# Patient Record
Sex: MALE | Race: WHITE | NOT HISPANIC OR LATINO | Employment: FULL TIME | URBAN - METROPOLITAN AREA
[De-identification: names, ages, dates, MRNs, and addresses within clinical notes are randomized per-mention and may not be internally consistent; named-entity substitution may affect disease eponyms.]

---

## 2017-04-10 ENCOUNTER — OFFICE VISIT - HEALTHEAST (OUTPATIENT)
Dept: INTERNAL MEDICINE | Facility: CLINIC | Age: 32
End: 2017-04-10

## 2017-04-10 DIAGNOSIS — Z00.00 ROUTINE GENERAL MEDICAL EXAMINATION AT A HEALTH CARE FACILITY: ICD-10-CM

## 2017-04-10 DIAGNOSIS — E55.9 VITAMIN D INSUFFICIENCY: ICD-10-CM

## 2017-04-10 DIAGNOSIS — R21 RASH: ICD-10-CM

## 2017-04-10 DIAGNOSIS — J30.2 SEASONAL ALLERGIC RHINITIS, UNSPECIFIED ALLERGIC RHINITIS TRIGGER: ICD-10-CM

## 2017-04-10 LAB
CHOLEST SERPL-MCNC: 151 MG/DL
FASTING STATUS PATIENT QL REPORTED: NORMAL
HDLC SERPL-MCNC: 46 MG/DL

## 2017-04-10 ASSESSMENT — MIFFLIN-ST. JEOR: SCORE: 1747.52

## 2017-04-12 ENCOUNTER — COMMUNICATION - HEALTHEAST (OUTPATIENT)
Dept: INTERNAL MEDICINE | Facility: CLINIC | Age: 32
End: 2017-04-12

## 2017-09-20 ENCOUNTER — OFFICE VISIT - HEALTHEAST (OUTPATIENT)
Dept: INTERNAL MEDICINE | Facility: CLINIC | Age: 32
End: 2017-09-20

## 2017-09-20 ENCOUNTER — HOSPITAL ENCOUNTER (OUTPATIENT)
Dept: CT IMAGING | Facility: CLINIC | Age: 32
Discharge: HOME | End: 2017-09-20
Attending: INTERNAL MEDICINE

## 2017-09-20 DIAGNOSIS — S09.92XA NASAL TRAUMA, INITIAL ENCOUNTER: ICD-10-CM

## 2017-09-20 ASSESSMENT — MIFFLIN-ST. JEOR: SCORE: 1742.98

## 2017-09-26 ENCOUNTER — RECORDS - HEALTHEAST (OUTPATIENT)
Dept: ADMINISTRATIVE | Facility: OTHER | Age: 32
End: 2017-09-26

## 2018-02-23 ENCOUNTER — COMMUNICATION - HEALTHEAST (OUTPATIENT)
Dept: INTERNAL MEDICINE | Facility: CLINIC | Age: 33
End: 2018-02-23

## 2018-02-23 ENCOUNTER — AMBULATORY - HEALTHEAST (OUTPATIENT)
Dept: INTERNAL MEDICINE | Facility: CLINIC | Age: 33
End: 2018-02-23

## 2018-05-25 ENCOUNTER — AMBULATORY - HEALTHEAST (OUTPATIENT)
Dept: INTERNAL MEDICINE | Facility: CLINIC | Age: 33
End: 2018-05-25

## 2018-05-29 ENCOUNTER — OFFICE VISIT - HEALTHEAST (OUTPATIENT)
Dept: INTERNAL MEDICINE | Facility: CLINIC | Age: 33
End: 2018-05-29

## 2018-05-29 DIAGNOSIS — Z00.00 ROUTINE GENERAL MEDICAL EXAMINATION AT A HEALTH CARE FACILITY: ICD-10-CM

## 2018-05-29 DIAGNOSIS — B35.6 TINEA CRURIS: ICD-10-CM

## 2018-05-29 DIAGNOSIS — E55.9 VITAMIN D INSUFFICIENCY: ICD-10-CM

## 2018-05-29 LAB
ALBUMIN SERPL-MCNC: 4.2 G/DL (ref 3.5–5)
ALBUMIN UR-MCNC: NEGATIVE MG/DL
ALP SERPL-CCNC: 45 U/L (ref 45–120)
ALT SERPL W P-5'-P-CCNC: 18 U/L (ref 0–45)
ANION GAP SERPL CALCULATED.3IONS-SCNC: 9 MMOL/L (ref 5–18)
APPEARANCE UR: CLEAR
AST SERPL W P-5'-P-CCNC: 19 U/L (ref 0–40)
BILIRUB SERPL-MCNC: 0.8 MG/DL (ref 0–1)
BILIRUB UR QL STRIP: NEGATIVE
BUN SERPL-MCNC: 17 MG/DL (ref 8–22)
CALCIUM SERPL-MCNC: 9.4 MG/DL (ref 8.5–10.5)
CHLORIDE BLD-SCNC: 106 MMOL/L (ref 98–107)
CHOLEST SERPL-MCNC: 165 MG/DL
CO2 SERPL-SCNC: 25 MMOL/L (ref 22–31)
COLOR UR AUTO: YELLOW
CREAT SERPL-MCNC: 1.05 MG/DL (ref 0.7–1.3)
ERYTHROCYTE [DISTWIDTH] IN BLOOD BY AUTOMATED COUNT: 11.8 % (ref 11–14.5)
FASTING STATUS PATIENT QL REPORTED: YES
GFR SERPL CREATININE-BSD FRML MDRD: >60 ML/MIN/1.73M2
GLUCOSE BLD-MCNC: 81 MG/DL (ref 70–125)
GLUCOSE UR STRIP-MCNC: NEGATIVE MG/DL
HCT VFR BLD AUTO: 42.8 % (ref 40–54)
HDLC SERPL-MCNC: 43 MG/DL
HGB BLD-MCNC: 14.1 G/DL (ref 14–18)
HGB UR QL STRIP: NEGATIVE
KETONES UR STRIP-MCNC: NEGATIVE MG/DL
LDLC SERPL CALC-MCNC: 108 MG/DL
LEUKOCYTE ESTERASE UR QL STRIP: NEGATIVE
MCH RBC QN AUTO: 29.8 PG (ref 27–34)
MCHC RBC AUTO-ENTMCNC: 33.1 G/DL (ref 32–36)
MCV RBC AUTO: 90 FL (ref 80–100)
NITRATE UR QL: NEGATIVE
PH UR STRIP: 6 [PH] (ref 5–8)
PLATELET # BLD AUTO: 226 THOU/UL (ref 140–440)
PMV BLD AUTO: 7.4 FL (ref 7–10)
POTASSIUM BLD-SCNC: 4.2 MMOL/L (ref 3.5–5)
PROT SERPL-MCNC: 6.7 G/DL (ref 6–8)
RBC # BLD AUTO: 4.74 MILL/UL (ref 4.4–6.2)
SODIUM SERPL-SCNC: 140 MMOL/L (ref 136–145)
SP GR UR STRIP: 1.02 (ref 1–1.03)
TRIGL SERPL-MCNC: 69 MG/DL
UROBILINOGEN UR STRIP-ACNC: NORMAL
WBC: 6.2 THOU/UL (ref 4–11)

## 2018-05-29 ASSESSMENT — MIFFLIN-ST. JEOR: SCORE: 1736.97

## 2018-05-30 LAB
25(OH)D3 SERPL-MCNC: 25.3 NG/ML (ref 30–80)
25(OH)D3 SERPL-MCNC: 25.3 NG/ML (ref 30–80)

## 2018-05-31 ENCOUNTER — COMMUNICATION - HEALTHEAST (OUTPATIENT)
Dept: INTERNAL MEDICINE | Facility: CLINIC | Age: 33
End: 2018-05-31

## 2019-07-23 ENCOUNTER — OFFICE VISIT - HEALTHEAST (OUTPATIENT)
Dept: INTERNAL MEDICINE | Facility: CLINIC | Age: 34
End: 2019-07-23

## 2019-07-23 DIAGNOSIS — J30.2 SEASONAL ALLERGIC RHINITIS, UNSPECIFIED TRIGGER: ICD-10-CM

## 2019-07-23 DIAGNOSIS — E55.9 VITAMIN D INSUFFICIENCY: ICD-10-CM

## 2019-07-23 DIAGNOSIS — Z00.00 ROUTINE GENERAL MEDICAL EXAMINATION AT A HEALTH CARE FACILITY: ICD-10-CM

## 2019-07-23 LAB
CHOLEST SERPL-MCNC: 177 MG/DL
FASTING STATUS PATIENT QL REPORTED: YES
FASTING STATUS PATIENT QL REPORTED: YES
GLUCOSE BLD-MCNC: 87 MG/DL (ref 70–125)
HDLC SERPL-MCNC: 52 MG/DL
LDLC SERPL CALC-MCNC: 110 MG/DL
TRIGL SERPL-MCNC: 73 MG/DL

## 2019-07-23 ASSESSMENT — MIFFLIN-ST. JEOR: SCORE: 1737.88

## 2019-07-24 LAB
25(OH)D3 SERPL-MCNC: 26.3 NG/ML (ref 30–80)
25(OH)D3 SERPL-MCNC: 26.3 NG/ML (ref 30–80)

## 2019-07-25 ENCOUNTER — COMMUNICATION - HEALTHEAST (OUTPATIENT)
Dept: INTERNAL MEDICINE | Facility: CLINIC | Age: 34
End: 2019-07-25

## 2019-08-19 ENCOUNTER — COMMUNICATION - HEALTHEAST (OUTPATIENT)
Dept: INTERNAL MEDICINE | Facility: CLINIC | Age: 34
End: 2019-08-19

## 2019-08-20 ENCOUNTER — AMBULATORY - HEALTHEAST (OUTPATIENT)
Dept: INTERNAL MEDICINE | Facility: CLINIC | Age: 34
End: 2019-08-20

## 2019-08-20 DIAGNOSIS — J30.9 ALLERGIC RHINITIS: ICD-10-CM

## 2019-08-30 ENCOUNTER — OFFICE VISIT - HEALTHEAST (OUTPATIENT)
Dept: ALLERGY | Facility: CLINIC | Age: 34
End: 2019-08-30

## 2019-08-30 DIAGNOSIS — J31.0 NONALLERGIC RHINITIS: ICD-10-CM

## 2019-08-30 DIAGNOSIS — J30.1 SEASONAL ALLERGIC RHINITIS DUE TO POLLEN: ICD-10-CM

## 2019-08-30 ASSESSMENT — MIFFLIN-ST. JEOR: SCORE: 1749.67

## 2020-03-02 ENCOUNTER — HEALTH MAINTENANCE LETTER (OUTPATIENT)
Age: 35
End: 2020-03-02

## 2020-07-28 ENCOUNTER — OFFICE VISIT - HEALTHEAST (OUTPATIENT)
Dept: INTERNAL MEDICINE | Facility: CLINIC | Age: 35
End: 2020-07-28

## 2020-07-28 DIAGNOSIS — Z00.00 ROUTINE GENERAL MEDICAL EXAMINATION AT A HEALTH CARE FACILITY: ICD-10-CM

## 2020-07-28 DIAGNOSIS — L98.9 SKIN LESION: ICD-10-CM

## 2020-07-28 DIAGNOSIS — E55.9 VITAMIN D INSUFFICIENCY: ICD-10-CM

## 2020-07-28 DIAGNOSIS — H61.22 IMPACTED CERUMEN OF LEFT EAR: ICD-10-CM

## 2020-07-28 DIAGNOSIS — Z87.898 HISTORY OF FEVER: ICD-10-CM

## 2020-07-28 LAB
ALBUMIN SERPL-MCNC: 4.5 G/DL (ref 3.5–5)
ALBUMIN UR-MCNC: NEGATIVE MG/DL
ALP SERPL-CCNC: 46 U/L (ref 45–120)
ALT SERPL W P-5'-P-CCNC: 13 U/L (ref 0–45)
ANION GAP SERPL CALCULATED.3IONS-SCNC: 10 MMOL/L (ref 5–18)
APPEARANCE UR: CLEAR
AST SERPL W P-5'-P-CCNC: 17 U/L (ref 0–40)
BACTERIA #/AREA URNS HPF: NORMAL HPF
BILIRUB SERPL-MCNC: 1 MG/DL (ref 0–1)
BILIRUB UR QL STRIP: NEGATIVE
BUN SERPL-MCNC: 17 MG/DL (ref 8–22)
CALCIUM SERPL-MCNC: 9.5 MG/DL (ref 8.5–10.5)
CHLORIDE BLD-SCNC: 106 MMOL/L (ref 98–107)
CHOLEST SERPL-MCNC: 166 MG/DL
CO2 SERPL-SCNC: 23 MMOL/L (ref 22–31)
COLOR UR AUTO: YELLOW
CREAT SERPL-MCNC: 1.1 MG/DL (ref 0.7–1.3)
ERYTHROCYTE [DISTWIDTH] IN BLOOD BY AUTOMATED COUNT: 11.6 % (ref 11–14.5)
FASTING STATUS PATIENT QL REPORTED: YES
GFR SERPL CREATININE-BSD FRML MDRD: >60 ML/MIN/1.73M2
GLUCOSE BLD-MCNC: 90 MG/DL (ref 70–125)
GLUCOSE UR STRIP-MCNC: NEGATIVE MG/DL
HCT VFR BLD AUTO: 42.7 % (ref 40–54)
HDLC SERPL-MCNC: 52 MG/DL
HGB BLD-MCNC: 14.5 G/DL (ref 14–18)
HGB UR QL STRIP: NEGATIVE
KETONES UR STRIP-MCNC: NEGATIVE MG/DL
LDLC SERPL CALC-MCNC: 104 MG/DL
LEUKOCYTE ESTERASE UR QL STRIP: NEGATIVE
MCH RBC QN AUTO: 30.3 PG (ref 27–34)
MCHC RBC AUTO-ENTMCNC: 33.9 G/DL (ref 32–36)
MCV RBC AUTO: 90 FL (ref 80–100)
NITRATE UR QL: NEGATIVE
PH UR STRIP: 6 [PH] (ref 5–8)
PLATELET # BLD AUTO: 211 THOU/UL (ref 140–440)
PMV BLD AUTO: 7.8 FL (ref 7–10)
POTASSIUM BLD-SCNC: 4.1 MMOL/L (ref 3.5–5)
PROT SERPL-MCNC: 7 G/DL (ref 6–8)
RBC # BLD AUTO: 4.77 MILL/UL (ref 4.4–6.2)
RBC #/AREA URNS AUTO: NORMAL HPF
SODIUM SERPL-SCNC: 139 MMOL/L (ref 136–145)
SP GR UR STRIP: >=1.03 (ref 1–1.03)
SQUAMOUS #/AREA URNS AUTO: NORMAL LPF
TRIGL SERPL-MCNC: 51 MG/DL
UROBILINOGEN UR STRIP-ACNC: NORMAL
WBC #/AREA URNS AUTO: NORMAL HPF
WBC: 4.6 THOU/UL (ref 4–11)

## 2020-07-28 ASSESSMENT — MIFFLIN-ST. JEOR: SCORE: 1715.2

## 2020-07-29 LAB
25(OH)D3 SERPL-MCNC: 32.3 NG/ML (ref 30–80)
25(OH)D3 SERPL-MCNC: 32.3 NG/ML (ref 30–80)
COVID-19 ANTIBODY IGG: NEGATIVE

## 2020-07-30 ENCOUNTER — COMMUNICATION - HEALTHEAST (OUTPATIENT)
Dept: INTERNAL MEDICINE | Facility: CLINIC | Age: 35
End: 2020-07-30

## 2020-10-29 ENCOUNTER — COMMUNICATION - HEALTHEAST (OUTPATIENT)
Dept: INTERNAL MEDICINE | Facility: CLINIC | Age: 35
End: 2020-10-29

## 2020-11-11 ENCOUNTER — OFFICE VISIT - HEALTHEAST (OUTPATIENT)
Dept: INTERNAL MEDICINE | Facility: CLINIC | Age: 35
End: 2020-11-11

## 2020-11-11 DIAGNOSIS — K30 UPSET STOMACH: ICD-10-CM

## 2020-11-11 DIAGNOSIS — E73.9 LACTOSE INTOLERANCE: ICD-10-CM

## 2020-11-11 DIAGNOSIS — U07.1 CLINICAL DIAGNOSIS OF COVID-19: ICD-10-CM

## 2020-12-20 ENCOUNTER — HEALTH MAINTENANCE LETTER (OUTPATIENT)
Age: 35
End: 2020-12-20

## 2021-04-18 ENCOUNTER — HEALTH MAINTENANCE LETTER (OUTPATIENT)
Age: 36
End: 2021-04-18

## 2021-05-30 VITALS — WEIGHT: 162 LBS | BODY MASS INDEX: 20.14 KG/M2 | HEIGHT: 75 IN

## 2021-05-30 NOTE — PROGRESS NOTES
Office Visit - Physical   Abram Lemus   34 y.o.  male    Date of visit: 7/23/2019  Physician: Neptali Jacobsen MD     Assessment and Plan   1. Routine general medical examination at a health care facility  Patient was a healthy lifestyle.  He will continue same.  Labs as below.  - Lipid Cascade  - Glucose  - Vitamin D, Total (25-Hydroxy)    2. Seasonal allergic rhinitis, unspecified trigger  We will try to add Zyrtec-D during the day.  See if that improves things.  Also we can try some Singulair.  If neither of these things help, would recommend allergy evaluation.  - montelukast (SINGULAIR) 10 mg tablet; Take 1 tablet (10 mg total) by mouth daily.  Dispense: 30 tablet; Refill: 2  - cetirizine-pseudoephedrine (ZYRTEC-D) 5-120 mg per tablet; Take 1 tablet by mouth 2 (two) times a day as needed for allergies.  Dispense: 60 tablet; Refill: 2    3. Vitamin D insufficiency    - Vitamin D, Total (25-Hydroxy)        Return in about 1 year (around 7/23/2020) for Annual physical.     Chief Complaint   Chief Complaint   Patient presents with     Annual Exam     fasting today         Patient Profile   Social History     Social History Narrative     Not on file        Past Medical History   Patient Active Problem List   Diagnosis     Allergic rhinitis     Vitamin D insufficiency       Past Surgical History  He has no past surgical history on file.     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  Reports his been doing relatively well except his allergies have been very bad.  He has been using Zyrtec sometimes twice a day without much improvement.  He has not tolerated nasal steroids in the past.  He has not tried anything else.  He has not seen an allergist.  It is definitely seasonal in nature with spring and fall being the worst.  Is otherwise been feeling well.  His Lam[place] is going well.  He is in [facility].  They have  "a school.  He continues to play basketball.  Nothing new with his family.    Review of Systems: A comprehensive review of systems was negative except as noted.     Medications and Allergies   Current Outpatient Medications   Medication Sig Dispense Refill     cetirizine (ZYRTEC) 10 MG tablet Take 10 mg by mouth daily.       MULTIVITAMIN ORAL Take 1 tablet by mouth daily.       cetirizine-pseudoephedrine (ZYRTEC-D) 5-120 mg per tablet Take 1 tablet by mouth 2 (two) times a day as needed for allergies. 60 tablet 2     montelukast (SINGULAIR) 10 mg tablet Take 1 tablet (10 mg total) by mouth daily. 30 tablet 2     No current facility-administered medications for this visit.      Allergies   Allergen Reactions     Sulfa (Sulfonamide Antibiotics)         Family and Social History   No family history on file.     Social History     Tobacco Use     Smoking status: Never Smoker     Smokeless tobacco: Never Used   Substance Use Topics     Alcohol use: Not on file     Drug use: Not on file        Physical Exam   General Appearance:   Pleasant thin gentleman in no distress.    /62 (Patient Site: Right Arm, Patient Position: Sitting, Cuff Size: Adult Regular)   Pulse 70   Ht 6' 2.75\" (1.899 m)   Wt 159 lb (72.1 kg)   SpO2 98%   BMI 20.01 kg/m      EYES: Eyelids, conjunctiva, and sclera were normal. Pupils were normal. Cornea, iris, and lens were normal bilaterally.  HEAD, EARS, NOSE, MOUTH, AND THROAT: Head and face were normal. Hearing was normal to voice and the ears were normal to external exam. Nose appearance was normal and there was no discharge. Oropharynx was normal.  NECK: Neck appearance was normal. There were no neck masses and the thyroid was not enlarged.  RESPIRATORY: Breathing pattern was normal and the chest moved symmetrically.  Percussion/auscultatory percussion was normal.  Lung sounds were normal and there were no abnormal sounds.  CARDIOVASCULAR: Heart rate and rhythm were normal.  S1 and S2 were " normal and there were no extra sounds or murmurs. Peripheral pulses in arms and legs were normal.  Jugular venous pressure was normal.  There was no peripheral edema.  GASTROINTESTINAL: The abdomen was normal in contour.  Bowel sounds were present.  Percussion detected no organ enlargement or tenderness.  Palpation detected no tenderness, mass, or enlarged organs.   MUSCULOSKELETAL: Skeletal configuration was normal and muscle mass was normal for age. Joint appearance was overall normal.  LYMPHATIC: There were no enlarged nodes.  SKIN/HAIR/NAILS: Skin color was normal.  There were no skin lesions.  Hair and nails were normal.  NEUROLOGIC: The patient was alert and oriented to person, place, time, and circumstance. Speech was normal. Cranial nerves were normal. Motor strength was normal for age. The patient was normally coordinated.  PSYCHIATRIC:  Mood and affect were normal and the patient had normal recent and remote memory. The patient's judgment and insight were normal.    ADDITIONAL VITAL SIGNS: none  CHEST WALL/BREASTS: nml    RECTAL: def  GENITAL/URINARY: nml penis, no rashes.     Additional Information        Neptali Jacobsen MD  Internal Medicine  Contact me at [phone]

## 2021-05-31 VITALS — HEIGHT: 75 IN | BODY MASS INDEX: 20.02 KG/M2 | WEIGHT: 161 LBS

## 2021-05-31 NOTE — TELEPHONE ENCOUNTER
Call pt per his request.  Please send patient the calcium and vit D pt info and ask him to try to get vitamin D from his foods if he cannot tolerate a supplement.  I am okay with him taking Zyrtec and Mucinex D separately.  Finally, refer him to allergy per his request.  Diagnosis is allergic rhinitis.  Please place order for referral.

## 2021-05-31 NOTE — PATIENT INSTRUCTIONS - HE
During spring, antihistamine    Apr-June    [facility] Sinus Rinse    Sensimist??    Otherwise, may want to see ENT

## 2021-05-31 NOTE — PROGRESS NOTES
Chief complaint: Possible allergies    History of present illness: This is a pleasant 34-year-old gentleman who I was asked to see by Dr. Jacobsen for evaluation of allergies.  He states that he has noted that his allergy symptoms have been worse.  Symptoms consist of sneezing, congestion and a runny nose.  He states that he will become sick.  When he becomes sick he has a sore throat.  He denies any wheezing or shortness of breath.  No previous history of asthma.  He has used Zyrtec and he feels that that helps.  He was prescribed Zyrtec-D but tries to use it minimally.  He prefers to use Mucinex DM instead.  He was given fluticasone nasal spray to use last year but did not tolerate this.  He was recently prescribed montelukast but notes that did not seem to help.  He is never been allergy tested.  Sense of smell is slightly decreased but he does have a sense of smell.  He does not think he had sinus infections.  He does not note much pressure in his face when he becomes sick.  He is been getting sick a lot more frequently and wonders if this is secondary to allergies.  He has used nasal rinses with some success.    Past medical history: Otherwise unremarkable    Social history: He lives in an older home that has had some water damage in the basement with possible mold.  This is been remedied but it continues to be damp.  They do not have central air.  No animals at his home but they do have carpeting.  He is a .  Non-smoker.    Family history: No history of allergy to his knowledge    Review of Systems performed as above and the remainder is negative.       Current Outpatient Medications:      cetirizine (ZYRTEC) 10 MG tablet, Take 10 mg by mouth daily., Disp: , Rfl:      cetirizine-pseudoephedrine (ZYRTEC-D) 5-120 mg per tablet, Take 1 tablet by mouth 2 (two) times a day as needed for allergies., Disp: 60 tablet, Rfl: 2     montelukast (SINGULAIR) 10 mg tablet, Take 1 tablet (10 mg total) by mouth daily.,  "Disp: 30 tablet, Rfl: 2     MULTIVITAMIN ORAL, Take 1 tablet by mouth daily., Disp: , Rfl:      MULTIVITAMIN ORAL, Take by mouth., Disp: , Rfl:     Allergies   Allergen Reactions     Sulfa (Sulfonamide Antibiotics)        /66   Pulse 73   Ht 6' 2.75\" (1.899 m)   Wt 161 lb 9.6 oz (73.3 kg)   BMI 20.33 kg/m    Gen: Pleasant male not in acute distress  HEENT: Eyes no erythema of the bulbar or palpebral conjunctiva, no edema. Ears: TMs well visualized, no effusions. Nose: No congestion, mucosa normal. Mouth: Throat clear, no lip or tongue edema.   Cardiac: Regular rate and rhythm, no murmurs, rubs or gallops  Respiratory: Clear to auscultation bilaterally, no adventitious breath sounds  Lymph: No supraclavicular or cervical lymphadenopathy  Skin: No rashes or lesions  Psych: Alert and oriented times 3    Last Percutaneous Allergy Test Results  Trees  Cecilio, White  1:20 H  (W/F in mm): 0/0 (08/30/19 1618)  Birch Mix 1:20 H (W/F in mm): 0/0 (08/30/19 1618)  [place], Common 1:20 H (W/F in mm): 0/0 (08/30/19 1618)  Elm, American 1:20 H (W/F in mm): 0/0 (08/30/19 1618)  Menard, Shagbark 1:20 H (W/F in mm): 0/0 (08/30/19 1618)  Maple, Hard/Sugar 1:20 H (W/F in mm): 5/10 (08/30/19 1618)  [place] Mix 1:20 H (W/F in mm): 0/0 (08/30/19 1618)  [place], Red 1:20 H (W/F in mm): 0/0 (08/30/19 1618)  [place], American 1:20 H (W/F in mm): 0/0 (08/30/19 1618)  [place] Tree 1:20 H (W/F in mm): 0/0 (08/30/19 1618)  Dust Mites  D. Pteronyssinus Mite 30,000 AU/ML H (W/F in mm): 0/0 (08/30/19 1618)  D. Farinae Mite 30,000 AU/ML H (W/F in mm: 0/0 (08/30/19 1618)  Grasses  Grass Mix #4 10,000 BAU/ML H: 0/0 (08/30/19 1618)  Charly Grass 1:20 H (W/F in mm): 0/0 (08/30/19 1618)  Cockroach  Cockroach Mix 1:10 H (W/F in mm): 0/0 (08/30/19 1618)  Molds/Fungi  Alternaria Tenuis 1:10 H (W/F in mm): 0/0 (08/30/19 1618)  Aspergillus Fumigatus 1:10 H (W/F in mm): 0/0 (08/30/19 1618)  Homodendrum Cladosporioides 1:10 H (W/F in mm): 0/0 (08/30/19 " 1618)  Penicillin Notatum 1:10 H (W/F in mm): 0/0 (08/30/19 1618)  Epicoccum 1:10 H (W/F in mm): 0/0 (08/30/19 1618)  Weeds  Ragweed, Short 1:20 H (W/F in mm): 0/0 (08/30/19 1618)  [place]ock, [place] 1:20 H (W/F in mm): 0/0 (08/30/19 1618)  Lamb's Quarter 1:20 H (W/F in mm): 0/0 (08/30/19 1618)  Pigweed, Rough Red Root 1:20 H  (W/F in mm): 0/0 (08/30/19 1618)  Plantain, English 1:20 H  (W/F in mm): 0/0 (08/30/19 1618)  Sagebrush, Mugwort 1:20 H  (W/F in mm): 0/0 (08/30/19 1618)  Animal  Cat 10,000 BAU/ML H (W/F in mm): 0/0 (08/30/19 1618)  Dog 1:10 H (W/F in mm): 0/0 (08/30/19 1618)  Controls  Device Type: QUINTIP (08/30/19 1618)  Neg. control: 50% Glycerine/Saline H (W/F in mm): 0/0 (08/30/19 1618)  Pos. control: Histamine 6mg/ML (W/F in mms): 6/20 (08/30/19 1618)        Impression report and plan:    1.  Allergic rhinitis to Maple trees  2.  Non-allergic rhinitis    Allergy testing does not explain his symptoms.  He was positive only to maple which only blooms in the spring.  This would explain why montelukast did not help.  Zyrtec may have a drying effect and that could be helping with his postnasal drip.  Recommended a trial of nasal rinses daily.  Would also suggest perhaps Sensimist given that he did not tolerate traditional fluticasone nasal spray.  Otherwise, he could see ENT to discuss other options.  Stated that acid reflux could also be a possibility.  He does not have any symptoms consistent with acid reflux, however.  Follow as needed.

## 2021-06-01 VITALS — HEIGHT: 75 IN | WEIGHT: 158.8 LBS | BODY MASS INDEX: 19.74 KG/M2

## 2021-06-03 VITALS — BODY MASS INDEX: 19.77 KG/M2 | WEIGHT: 159 LBS | HEIGHT: 75 IN

## 2021-06-03 VITALS — BODY MASS INDEX: 20.09 KG/M2 | HEIGHT: 75 IN | WEIGHT: 161.6 LBS

## 2021-06-04 VITALS
DIASTOLIC BLOOD PRESSURE: 60 MMHG | WEIGHT: 154 LBS | HEART RATE: 70 BPM | SYSTOLIC BLOOD PRESSURE: 110 MMHG | OXYGEN SATURATION: 99 % | BODY MASS INDEX: 19.15 KG/M2 | HEIGHT: 75 IN

## 2021-06-10 NOTE — PROGRESS NOTES
Office Visit - Physical   Abram Lemus   31 y.o.  male    Date of visit: 4/10/2017  Physician: Neptali Jacobsen MD     Assessment and Plan   1. Routine general medical examination at a health care facility  Patient was a healthy lifestyle.  Labs as below.  Urged more fruits and vegetables in his diet.  - Comprehensive Metabolic Panel  - HM2(CBC w/o Differential)  - Urinalysis-UC if Indicated  - Cholesterol, Total  - HDL Cholesterol    2. Seasonal allergic rhinitis, unspecified allergic rhinitis trigger  Good control with current regimen.  Continue same.    3. Rash  Lotrisone prescribed.  He is twice daily for 2 weeks.  Call if not improved or resolved.    4. Vitamin D insufficiency  He had difficulty with vitamin D.  It made him feel woozy or dizzy.  Try to increase vitamin D in his diet.  - Vitamin D, Total (25-Hydroxy)        Return in about 1 year (around 4/10/2018) for Annual physical.     Chief Complaint   Chief Complaint   Patient presents with     Annual Exam     not fasting     Rash     rt thigh rash x 1 mo. Sxs: Red and itchy. No blood or discharge        Patient Profile   Social History     Social History Narrative        Past Medical History   Patient Active Problem List   Diagnosis     Allergic rhinitis     Head or neck swelling, mass, or lump     Vitamin D insufficiency       Past Surgical History  He has no past surgical history on file.     History of Present Illness   This 31 y.o. old patient comes in today for full physical.  He reports his been doing well.  I have not seen him in a couple years.  He can continues to remain fairly active.  Plays basketball at least once a week.  Maintains a very thin weight.  He eats fairly healthy although not enough fruits and vegetables he admits.  He has been dealing with persistent recurrent sore throat with allergies.  He is now on Flonase as well as Zyrtec with good control of that.  He notes a rash on his thigh he wants me to look at.  Tried some  "triamcinolone which turned the area brown but seemed to get worse again.  It is red and itchy.  Otherwise denies concerns.  He is probably going to be moving to a new [place] this summer.  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    Review of Systems: A comprehensive review of systems was negative except as noted.     Medications and Allergies   Current Outpatient Prescriptions   Medication Sig Dispense Refill     cetirizine (ZYRTEC) 10 MG tablet Take 10 mg by mouth daily.       fluticasone (FLONASE) 50 mcg/actuation nasal spray 1 spray into each nostril daily.       MULTIVITAMIN ORAL Take 1 tablet by mouth daily.       clotrimazole-betamethasone (LOTRISONE) cream Apply to affected area 2 times daily 15 g 0     No current facility-administered medications for this visit.      Allergies   Allergen Reactions     Sulfa (Sulfonamide Antibiotics)         Family and Social History   No family history on file.     Social History   Substance Use Topics     Smoking status: Never Smoker     Smokeless tobacco: None     Alcohol use None        Physical Exam   General Appearance:   Pleasant thin gentleman in no distress.    /74 (Patient Site: Right Arm, Patient Position: Sitting, Cuff Size: Adult Small)  Pulse 64  Ht 6' 2.5\" (1.892 m)  Wt 162 lb (73.5 kg)  SpO2 98%  BMI 20.52 kg/m2    EYES: Eyelids, conjunctiva, and sclera were normal. Pupils were normal. Cornea, iris, and lens were normal bilaterally.  HEAD, EARS, NOSE, MOUTH, AND THROAT: Head and face were normal. Hearing was normal to voice and the ears were normal to external exam. Nose appearance was normal and there was no discharge. Oropharynx was normal.  NECK: Neck appearance was normal. There were no neck masses and the thyroid was not enlarged.  RESPIRATORY: Breathing pattern was normal and the chest moved symmetrically.  Percussion/auscultatory percussion was normal.  Lung sounds were normal and there were no abnormal sounds.  CARDIOVASCULAR: " Heart rate and rhythm were normal.  S1 and S2 were normal and there were no extra sounds or murmurs. Peripheral pulses in arms and legs were normal.  Jugular venous pressure was normal.  There was no peripheral edema.  GASTROINTESTINAL: The abdomen was normal in contour.  Bowel sounds were present.  Percussion detected no organ enlargement or tenderness.  Palpation detected no tenderness, mass, or enlarged organs.   MUSCULOSKELETAL: Skeletal configuration was normal and muscle mass was normal for age. Joint appearance was overall normal.  He has a tendon cyst over the back of his right hand.  LYMPHATIC: There were no enlarged nodes.  SKIN/HAIR/NAILS: Skin color was normal.  He has a tinea lesion in his right medial thigh.  Hair and nails were normal.  NEUROLOGIC: The patient was alert and oriented to person, place, time, and circumstance. Speech was normal. Cranial nerves were normal. Motor strength was normal for age. The patient was normally coordinated.  PSYCHIATRIC:  Mood and affect were normal and the patient had normal recent and remote memory. The patient's judgment and insight were normal.    ADDITIONAL VITAL SIGNS: None  CHEST WALL/BREASTS: Normal   RECTAL: Deferred   GENITAL/URINARY: Normal      Additional Information        Neptali Jacobsen MD  Internal Medicine  Contact me at None

## 2021-06-10 NOTE — PROGRESS NOTES
Office Visit - Physical   Abram Lemus   35 y.o.  male    Date of visit: 7/28/2020  Physician: Neptali Jacobsen MD     Assessment and Plan   1. Routine general medical examination at a health care facility  He lives a healthy lifestyle.  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  Labs as below.  - COVID-19 Virus (Coronavirus) Antibody Screen, IgG; Future  - Lipid Cascade  - Comprehensive Metabolic Panel  - Urinalysis-UC if Indicated  - HM2(CBC w/o Differential)  - Vitamin D, Total (25-Hydroxy)  - Ambulatory referral to Dermatology    2. Vitamin D insufficiency  He is not taking vitamin D.  Check level today.  - Vitamin D, Total (25-Hydroxy)    3. Skin lesion  Likely not concerning but we will have him have undergo a baseline skin exam.  - Ambulatory referral to Dermatology    4. Impacted cerumen of left ear  Irrigation today.    5. History of fever  Check serologies per his request.  - COVID-19 Virus (Coronavirus) Antibody Screen, IgG; Future        Return in about 1 year (around 7/28/2021) for Annual physical.     Chief Complaint   Chief Complaint   Patient presents with     Annual Exam     fasting     Hearing Problem     check left ear - check for wax      Mass     pt reports a small mass on left side of head         Patient Profile   Social History     Social History Narrative     Not on file        Past Medical History   Patient Active Problem List   Diagnosis     Vasomotor rhinitis     Vitamin D insufficiency       Past Surgical History  He has no past surgical history on file.     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  Reports has been feeling well.  He took on another perished near his current 1.  He has an associate helping out.  He has been trying to stay healthy.  Does do some exercise.  Tries to eat healthy.  Notes a lesion on his scalp that he would like me to look at.  Notes decreased hearing and possibly something in his  "left ear.  He was ill while traveling back from [place] in February and wonders if he could have had COVID and would like to have antibody testing.  Wonders if I will prescribe him hydroxychloroquine if he gets sick with COVID-19.    Review of Systems: A comprehensive review of systems was negative except as noted.     Medications and Allergies   Current Outpatient Medications   Medication Sig Dispense Refill     MULTIVITAMIN ORAL Take 1 tablet by mouth daily.       No current facility-administered medications for this visit.      Allergies   Allergen Reactions     Sulfa (Sulfonamide Antibiotics)         Family and Social History   No family history on file.     Social History     Tobacco Use     Smoking status: Never Smoker     Smokeless tobacco: Never Used   Substance Use Topics     Alcohol use: Not on file     Drug use: Not on file        Physical Exam   General Appearance:   Pleasant very thin gentleman in no distress.    /60   Pulse 70   Ht 6' 2.75\" (1.899 m)   Wt 154 lb (69.9 kg)   SpO2 99%   BMI 19.38 kg/m      EYES: Eyelids, conjunctiva, and sclera were normal. Pupils were normal. Cornea, iris, and lens were normal bilaterally.  HEAD, EARS, NOSE, MOUTH, AND THROAT: Head and face were normal. Hearing was normal to voice and the ears were normal to external exam.  Small amount of cerumen in the left canal.  Nose appearance was normal and there was no discharge. Oropharynx was normal.  NECK: Neck appearance was normal. There were no neck masses and the thyroid was not enlarged.  RESPIRATORY: Breathing pattern was normal and the chest moved symmetrically.  Percussion/auscultatory percussion was normal.  Lung sounds were normal and there were no abnormal sounds.  CARDIOVASCULAR: Heart rate and rhythm were normal.  S1 and S2 were normal and there were no extra sounds or murmurs. Peripheral pulses in arms and legs were normal.  Jugular venous pressure was normal.  There was no peripheral " edema.  GASTROINTESTINAL: The abdomen was normal in contour.  Bowel sounds were present.  Percussion detected no organ enlargement or tenderness.  Palpation detected no tenderness, mass, or enlarged organs.   MUSCULOSKELETAL: Skeletal configuration was normal and muscle mass was normal for age. Joint appearance was overall normal.  LYMPHATIC: There were no enlarged nodes.  SKIN/HAIR/NAILS: Skin color was normal.  Hyperkeratotic left parietal region.  Hair and nails were normal.  NEUROLOGIC: The patient was alert and oriented to person, place, time, and circumstance. Speech was normal. Cranial nerves were normal. Motor strength was normal for age. The patient was normally coordinated.  PSYCHIATRIC:  Mood and affect were normal and the patient had normal recent and remote memory. The patient's judgment and insight were normal.    ADDITIONAL VITAL SIGNS: None  CHEST WALL/BREASTS: nml  RECTAL: Deferred  GENITAL/URINARY: Normal penis and testes.     Additional Information        Neptali Jacobsen MD  Internal Medicine  Contact me at [phone]

## 2021-06-13 NOTE — PROGRESS NOTES
"Abram Lemus is a 35 y.o. male who is being evaluated via a billable video visit.      The patient has been notified of following:     \"This video visit will be conducted via a call between you and your physician/provider. We have found that certain health care needs can be provided without the need for an in-person physical exam.  This service lets us provide the care you need with a video conversation.  If a prescription is necessary we can send it directly to your pharmacy.  If lab work is needed we can place an order for that and you can then stop by our lab to have the test done at a later time.    Video visits are billed at different rates depending on your insurance coverage. Please reach out to your insurance provider with any questions.    If during the course of the call the physician/provider feels a video visit is not appropriate, you will not be charged for this service.\"    Patient has given verbal consent to a Video visit? Yes  How would you like to obtain your AVS? AVS Preference: MyChart.  If dropped by the video visit, the video invitation should be sent to: Send to e-mail at: [email or web address]  Will anyone else be joining your video visit? No        Video Start Time: 1024    Additional provider notes:     Office Visit - Follow Up   Abram Lemus   35 y.o. male    Date of Visit: 11/11/2020    Chief Complaint   Patient presents with     Follow-up     [facility] Video: [phone] - discuss lactose intolerance (patient has been avoiding diary & taking lactaid supplement pills)      Follow Up     pt reports that he was tested positive for Covid (results came in yesterday) developed sxs's x 5 days         Assessment and Plan   1. Clinical diagnosis of COVID-19  He seems to be recovering already.  If he has worsening symptoms again or any new changes he is to let us know immediately.  I did offer for patient to go into the get well loop but he declines this.    2. Upset " stomach  Likely a development of lactose intolerance due to lactase deficiency.  I discussed this with him.  It could be that this would as a prodrome of his infection but he does not believe so.  I did discuss with patient that the treatment for lactose intolerance is avoidance of lactose or lactase replacement or both.  He understands.    3. Lactose intolerance  As above.        Return in about 9 months (around 8/11/2021) for Annual physical.     History of Present Illness   This 35 y.o. old patient joins me on a video visit.  He made this appointment because of some lactose intolerance he suspects.  He started having an upset stomach after drinking milk.  This persistent so he stopped drinking milk and his upset stomach some seem to go away.  There was no excessive flatulence or diarrhea.  No weight loss.  Otherwise feeling well.  This all started about a month ago.  He started adding back Lactaid or drinking Lactaid milk and he would tolerate those without difficulty so he presumes he is lactose intolerance.  Then however, patient over the weekend developed fever chills and body aches.  He is now been tested positive for COVID-19.  He is feeling better.  No longer having fever.  See below for symptoms.  He is quarantining at home.  He does not know how he acquired the coronavirus.    Review of Systems: A comprehensive review of systems was negative except as noted.     Medications, Allergies and Problem List   Reviewed, reconciled and updated  Post Discharge Medication Reconciliation Status:      Physical Exam   General Appearance:   Pleasant gentleman who appears well on video.    There were no vitals taken for this visit.         Additional Information   Current Outpatient Medications   Medication Sig Dispense Refill     ibuprofen (ADVIL,MOTRIN) 200 MG tablet Take 400 mg by mouth every 6 (six) hours as needed for pain.       lactase (LACTAID ORAL) Take 1 tablet by mouth daily. For abd pains/discomfort        MULTIVITAMIN ORAL Take 1 tablet by mouth daily.       zinc gluconate 50 mg tablet Take 50 mg by mouth daily.       No current facility-administered medications for this visit.      Allergies   Allergen Reactions     Sulfa (Sulfonamide Antibiotics)      Social History     Tobacco Use     Smoking status: Never Smoker     Smokeless tobacco: Never Used   Substance Use Topics     Alcohol use: Not on file     Drug use: Not on file       Review and/or order of clinical lab tests:  Review and/or order of radiology tests:  Review and/or order of medicine tests:  Discussion of test results with performing physician:  Decision to obtain old records and/or obtain history from someone other than the patient:  Review and summarization of old records and/or obtaining history from someone other than the patient and.or discussion of case with another health care provider:  Independent visualization of image, tracing or specimen itself:    Time: not applicable     Neptali Jacobsen MD    Concern for COVID-19  About how many days ago did these symptoms start? 11/7/20  Is this your first visit for this illness? Yes  In the 14 days before your symptoms started, have you had close contact with someone with COVID-19 (Coronavirus)? I do not know.  Do you have a fever or chills? Yes, I felt feverish or had chills  Are you having new or worsening difficulty breathing? No  Do you have new or worsening cough? No  Have you had any new or unexplained body aches? No  Have you experienced any of the following NEW symptoms?    Headache: Mild - ibuprofen prn     Sore throat: No    Loss of taste or smell: mild - changes in taste    Chest pain: No    Diarrhea: No    Rash: No  What treatments have you tried? Ibuprofen prn, zinc   Who do you live with? No one   Are you, or a household member, a healthcare worker or a ? No  Do you live in a nursing home, group home, or shelter? No  Do you have a way to get food/medications if quarantined?  Yes, I have a friend or family member who can help me.          Video-Visit Details    Type of service:  Video Visit    Video End Time (time video stopped): 1038  Originating Location (pt. Location): Home    Distant Location (provider location):  [facility]     Platform used for Video Visit: Nadia Jacobsen MD

## 2021-06-13 NOTE — PROGRESS NOTES
"  Office Visit - Follow Up   Abram Lemus   32 y.o. male    Date of Visit: 9/20/2017    Chief Complaint   Patient presents with     Facial Injury     hit a the back of a head with his nose. x4 days        Assessment and Plan   1. Nasal trauma, initial encounter  Given the persistent bleeding and severity of this going to do a CT scan of the facial bones to look at what all was done here.  We will do that today hold and call and then proceed with ENT evaluation presumably.  He is in agreement with that plan.  - CT Facial Bones Without Contrast; Future  - Ambulatory referral to ENT    Addendum: Patient does have subtle nasal bone fracture.  He will proceed with ENT consultation for further evaluation.    No Follow-up on file.     History of Present Illness   This 32 y.o. old delightful young Maria Esther  now in [facility] comes in today for evaluation of nasal trauma that he sustained 4 days ago.  He was playing basketball and got a head thrown back into his nose.  He had profuse bleeding for at least a couple hours.  Swelling and discomfort.  He has difficulty breathing through his nostrils.  He continues to have bleeding.  He notes dripping from his nose especially on the right every couple hours for the last 4 days.  He is concerned about that aspect.    Review of Systems: A comprehensive review of systems was negative except as noted.     Medications, Allergies and Problem List   Reviewed and updated     Physical Exam   General Appearance:   Pleasant young thin gentleman.  He has ecchymosis below his left eye.  He has some swelling of the nasal bridge.  I do not see any active bleeding today.  There is some septal deviation but I am not sure if that is new.    /56 (Patient Site: Right Arm, Patient Position: Sitting, Cuff Size: Adult Regular)  Pulse 86  Ht 6' 2.5\" (1.892 m)  Wt 161 lb (73 kg)  BMI 20.39 kg/m2         Additional Information   Current Outpatient Prescriptions   Medication " Sig Dispense Refill     cetirizine (ZYRTEC) 10 MG tablet Take 10 mg by mouth daily.       MULTIVITAMIN ORAL Take 1 tablet by mouth daily.       fluticasone (FLONASE) 50 mcg/actuation nasal spray 1 spray into each nostril daily.       No current facility-administered medications for this visit.      Allergies   Allergen Reactions     Sulfa (Sulfonamide Antibiotics)      Social History   Substance Use Topics     Smoking status: Never Smoker     Smokeless tobacco: None     Alcohol use None       Review and/or order of clinical lab tests:  Review and/or order of radiology tests:  Review and/or order of medicine tests:  Discussion of test results with performing physician:  Decision to obtain old records and/or obtain history from someone other than the patient:  Review and summarization of old records and/or obtaining history from someone other than the patient and.or discussion of case with another health care provider:  Independent visualization of image, tracing or specimen itself:    Time: total time spent with the patient was 15 minutes of which >50% was spent in counseling and coordination of care     Neptali Jacobsen MD

## 2021-06-18 NOTE — PROGRESS NOTES
Office Visit - Physical   Abram Lemus   33 y.o.  male    Date of visit: 5/29/2018  Physician: Neptali Jacobsen MD     Assessment and Plan   1. Routine general medical examination at a health care facility  Patient was a healthy lifestyle.  He will be due for colonoscopy in 2020.  He will be due for tetanus booster next year.  Continue his healthy active lifestyle and I will see him next year per  - Vitamin D, Total (25-Hydroxy)  - Lipid Cascade  - Comprehensive Metabolic Panel  - Urinalysis-UC if Indicated  - HM2(CBC w/o Differential); Future    2. Tinea cruris  We discussed ways of keeping his groin dryer with some powders and not using tight fitting underwear.  Also Lamisil cream prescribed.    3. Vitamin D insufficiency    - Vitamin D, Total (25-Hydroxy)        No Follow-up on file.     Chief Complaint   Chief Complaint   Patient presents with     Annual Wellness Visit     Physical-patient is fasting, rash on L thigh        Patient Profile   Social History     Social History Narrative        Past Medical History   Patient Active Problem List   Diagnosis     Allergic rhinitis     Vitamin D insufficiency       Past Surgical History  He has no past surgical history on file.     History of Present Illness   This 33 y.o. old pleasant 33-year-old Karval  from [place], [place] comes in today for physical.  Reports things been going well.  He does get some recurrent rash in his groin.  This is not new.  He is utilize some clotrimazole betamethasone cream which takes away but then it usually comes back a few weeks later.  Wonders what he can do to get rid of it for good.  He lives healthy lifestyle.  Rarely drinks alcohol.  Exercises a couple days a week with basketball.  Stands at a standing desk.  Tries to eat as healthy as he can.    Parents and 3 siblings are doing well.  Youngest brother just graduated from the [employer] and will be doing  training.    Review of Systems: A  "comprehensive review of systems was negative except as noted.     Medications and Allergies   Current Outpatient Prescriptions   Medication Sig Dispense Refill     cetirizine (ZYRTEC) 10 MG tablet Take 10 mg by mouth daily.       MULTIVITAMIN ORAL Take 1 tablet by mouth daily.       terbinafine HCl (LAMISIL AT) 1 % cream Apply once to twice daily for 2 weeks until clear. 24 g 0     No current facility-administered medications for this visit.      Allergies   Allergen Reactions     Sulfa (Sulfonamide Antibiotics)         Family and Social History   No family history on file.     Social History   Substance Use Topics     Smoking status: Never Smoker     Smokeless tobacco: Not on file     Alcohol use Not on file        Physical Exam   General Appearance:   Pleasant thin tall gentleman in no distress.    /64 (Patient Site: Right Arm, Patient Position: Sitting)  Pulse 66  Ht 6' 2.75\" (1.899 m)  Wt 158 lb 12.8 oz (72 kg)  SpO2 96%  BMI 19.98 kg/m2    EYES: Eyelids, conjunctiva, and sclera were normal. Pupils were normal. Cornea, iris, and lens were normal bilaterally.  HEAD, EARS, NOSE, MOUTH, AND THROAT: Head and face were normal. Hearing was normal to voice and the ears were normal to external exam. Nose appearance was normal and there was no discharge. Oropharynx was normal.  NECK: Neck appearance was normal. There were no neck masses and the thyroid was not enlarged.  RESPIRATORY: Breathing pattern was normal and the chest moved symmetrically.  Percussion/auscultatory percussion was normal.  Lung sounds were normal and there were no abnormal sounds.  CARDIOVASCULAR: Heart rate and rhythm were normal.  S1 and S2 were normal and there were no extra sounds or murmurs. Peripheral pulses in arms and legs were normal.  Jugular venous pressure was normal.  There was no peripheral edema.  GASTROINTESTINAL: The abdomen was normal in contour.  Bowel sounds were present.  Percussion detected no organ enlargement or " tenderness.  Palpation detected no tenderness, mass, or enlarged organs.   MUSCULOSKELETAL: Skeletal configuration was normal and muscle mass was normal for age. Joint appearance was overall normal.  LYMPHATIC: There were no enlarged nodes.  SKIN/HAIR/NAILS: Skin color was normal.  Tinea cruris in groin.   hair and nails were normal.  NEUROLOGIC: The patient was alert and oriented to person, place, time, and circumstance. Speech was normal. Cranial nerves were normal. Motor strength was normal for age. The patient was normally coordinated.  PSYCHIATRIC:  Mood and affect were normal and the patient had normal recent and remote memory. The patient's judgment and insight were normal.    ADDITIONAL VITAL SIGNS:   CHEST WALL/BREASTS: Normal  RECTAL: Deferred  GENITAL/URINARY: Normal penis and testicles.     Additional Information        Neptali Jacobsen MD  Internal Medicine  Contact me at None

## 2021-06-19 NOTE — LETTER
Letter by Ling Gann MD at      Author: Ling Gann MD Service: -- Author Type: --    Filed:  Encounter Date: 8/30/2019 Status: (Other)         August 30, 2019     Neptali Jacobsen MD  [address] [place] [address]    Patient: Abram Lemus   MR Number: [id number]   YOB: 1985   Date of Visit: 8/30/2019     Dear Dr. Delmar MD:    Thank you for referring Abram Lemus to me for evaluation. Allergy testing was positive only for maple trees, so it does not explain his year round symptoms.  I recommended a trial of nasal rinse and Sensimist, which may be better tolerated.  He could consider ENT evaluation as well.  I have included my note for your review.      If you have questions, please do not hesitate to call me.    Sincerely,        Ling Gann MD        CC  No Recipients    Progress Notes:Chief complaint: Possible allergies    History of present illness: This is a pleasant 34-year-old gentleman who I was asked to see by Dr. Jacobsen for evaluation of allergies.  He states that he has noted that his allergy symptoms have been worse.  Symptoms consist of sneezing, congestion and a runny nose.  He states that he will become sick.  When he becomes sick he has a sore throat.  He denies any wheezing or shortness of breath.  No previous history of asthma.  He has used Zyrtec and he feels that that helps.  He was prescribed Zyrtec-D but tries to use it minimally.  He prefers to use Mucinex DM instead.  He was given fluticasone nasal spray to use last year but did not tolerate this.  He was recently prescribed montelukast but notes that did not seem to help.  He is never been allergy tested.  Sense of smell is slightly decreased but he does have a sense of smell.  He does not think he had sinus infections.  He does not note much pressure in his face when he becomes sick.  He is been getting sick a lot more frequently and wonders if this is secondary to  "allergies.  He has used nasal rinses with some success.    Past medical history: Otherwise unremarkable    Social history: He lives in an older home that has had some water damage in the basement with possible mold.  This is been remedied but it continues to be damp.  They do not have central air.  No animals at his home but they do have carpeting.  He is a .  Non-smoker.    Family history: No history of allergy to his knowledge    Review of Systems performed as above and the remainder is negative.       Current Outpatient Medications:   ?  cetirizine (ZYRTEC) 10 MG tablet, Take 10 mg by mouth daily., Disp: , Rfl:   ?  cetirizine-pseudoephedrine (ZYRTEC-D) 5-120 mg per tablet, Take 1 tablet by mouth 2 (two) times a day as needed for allergies., Disp: 60 tablet, Rfl: 2  ?  montelukast (SINGULAIR) 10 mg tablet, Take 1 tablet (10 mg total) by mouth daily., Disp: 30 tablet, Rfl: 2  ?  MULTIVITAMIN ORAL, Take 1 tablet by mouth daily., Disp: , Rfl:   ?  MULTIVITAMIN ORAL, Take by mouth., Disp: , Rfl:     Allergies   Allergen Reactions   ? Sulfa (Sulfonamide Antibiotics)        /66   Pulse 73   Ht 6' 2.75\" (1.899 m)   Wt 161 lb 9.6 oz (73.3 kg)   BMI 20.33 kg/m     Gen: Pleasant male not in acute distress  HEENT: Eyes no erythema of the bulbar or palpebral conjunctiva, no edema. Ears: TMs well visualized, no effusions. Nose: No congestion, mucosa normal. Mouth: Throat clear, no lip or tongue edema.   Cardiac: Regular rate and rhythm, no murmurs, rubs or gallops  Respiratory: Clear to auscultation bilaterally, no adventitious breath sounds  Lymph: No supraclavicular or cervical lymphadenopathy  Skin: No rashes or lesions  Psych: Alert and oriented times 3    Last Percutaneous Allergy Test Results  Trees  Cecilio, White  1:20 H  (W/F in mm): 0/0 (08/30/19 1618)  Birch Mix 1:20 H (W/F in mm): 0/0 (08/30/19 1618)  Spink, Common 1:20 H (W/F in mm): 0/0 (08/30/19 1618)  Yael Mckeon 1:20 H (W/F in mm): 0/0 " (08/30/19 1618)  Cimarron, Shagbark 1:20 H (W/F in mm): 0/0 (08/30/19 1618)  Maple, Hard/Sugar 1:20 H (W/F in mm): 5/10 (08/30/19 1618)  Minerva Mix 1:20 H (W/F in mm): 0/0 (08/30/19 1618)  Tucson, Red 1:20 H (W/F in mm): 0/0 (08/30/19 1618)  Thornton, American 1:20 H (W/F in mm): 0/0 (08/30/19 1618)  Sturgis Tree 1:20 H (W/F in mm): 0/0 (08/30/19 1618)  Dust Mites  D. Pteronyssinus Mite 30,000 AU/ML H (W/F in mm): 0/0 (08/30/19 1618)  D. Farinae Mite 30,000 AU/ML H (W/F in mm: 0/0 (08/30/19 1618)  Grasses  Grass Mix #4 10,000 BAU/ML H: 0/0 (08/30/19 1618)  Charly Grass 1:20 H (W/F in mm): 0/0 (08/30/19 1618)  Cockroach  Cockroach Mix 1:10 H (W/F in mm): 0/0 (08/30/19 1618)  Molds/Fungi  Alternaria Tenuis 1:10 H (W/F in mm): 0/0 (08/30/19 1618)  Aspergillus Fumigatus 1:10 H (W/F in mm): 0/0 (08/30/19 1618)  Homodendrum Cladosporioides 1:10 H (W/F in mm): 0/0 (08/30/19 1618)  Penicillin Notatum 1:10 H (W/F in mm): 0/0 (08/30/19 1618)  Epicoccum 1:10 H (W/F in mm): 0/0 (08/30/19 1618)  Weeds  Ragweed, Short 1:20 H (W/F in mm): 0/0 (08/30/19 1618)  Dock, [place] 1:20 H (W/F in mm): 0/0 (08/30/19 1618)  Lamb's Quarter 1:20 H (W/F in mm): 0/0 (08/30/19 1618)  Pigweed, Rough Red Root 1:20 H  (W/F in mm): 0/0 (08/30/19 1618)  Plantain, English 1:20 H  (W/F in mm): 0/0 (08/30/19 1618)  Sagebrush, Mugwort 1:20 H  (W/F in mm): 0/0 (08/30/19 1618)  Animal  Cat 10,000 BAU/ML H (W/F in mm): 0/0 (08/30/19 1618)  Dog 1:10 H (W/F in mm): 0/0 (08/30/19 1618)  Controls  Device Type: QUINTIP (08/30/19 1618)  Neg. control: 50% Glycerine/Saline H (W/F in mm): 0/0 (08/30/19 1618)  Pos. control: Histamine 6mg/ML (W/F in mms): 6/20 (08/30/19 1618)        Impression report and plan:    1.  Allergic rhinitis to Maple trees  2.  Non-allergic rhinitis    Allergy testing does not explain his symptoms.  He was positive only to maple which only blooms in the spring.  This would explain why montelukast did not help.  Zyrtec may have a drying effect  and that could be helping with his postnasal drip.  Recommended a trial of nasal rinses daily.  Would also suggest perhaps Sensimist given that he did not tolerate traditional fluticasone nasal spray.  Otherwise, he could see ENT to discuss other options.  Stated that acid reflux could also be a possibility.  He does not have any symptoms consistent with acid reflux, however.  Follow as needed.

## 2021-06-19 NOTE — LETTER
Letter by Neptali Jacobsen MD at      Author: Neptali Jacobsen MD Service: -- Author Type: --    Filed:  Encounter Date: 7/25/2019 Status: (Other)         Abram Lemus  [address] [place] [id number]             July 25, 2019         Dear Mr. Lemus,    Below are the results from your recent visit:    Resulted Orders   Lipid Cascade   Result Value Ref Range    Cholesterol 177 <=199 mg/dL    Triglycerides 73 <=149 mg/dL    HDL Cholesterol 52 >=40 mg/dL    LDL Calculated 110 <=129 mg/dL    Patient Fasting > 8hrs? Yes    Glucose   Result Value Ref Range    Glucose 87 70 - 125 mg/dL    Patient Fasting > 8hrs? Yes     Narrative    Fasting Glucose reference range is 70-99 mg/dL per  American Diabetes Association (ADA) guidelines.   Vitamin D, Total (25-Hydroxy)   Result Value Ref Range    Vitamin D, Total (25-Hydroxy) 26.3 (L) 30.0 - 80.0 ng/mL    Narrative    Deficiency <10.0 ng/mL  Insufficiency 10.0-29.9 ng/mL  Sufficiency 30.0-80.0 ng/mL  Toxicity (possible) >100.0 ng/mL       Please start taking an additional 2000 units of vitamin D3 every day.  Everything else here looks very good.     Please call with questions or contact us using [employer].    Sincerely,        Electronically signed by Neptali Jacobsen MD

## 2021-06-20 NOTE — LETTER
Letter by Neptali Jacobsen MD at      Author: Neptali Jacobsen MD Service: -- Author Type: --    Filed:  Encounter Date: 7/30/2020 Status: (Other)         Abram Lemus  [address] [place] [id number]             July 30, 2020         Dear Mr. Lemus,    Below are the results from your recent visit:    Resulted Orders   Lipid Cascade   Result Value Ref Range    Cholesterol 166 <=199 mg/dL    Triglycerides 51 <=149 mg/dL    HDL Cholesterol 52 >=40 mg/dL    LDL Calculated 104 <=129 mg/dL    Patient Fasting > 8hrs? Yes    Comprehensive Metabolic Panel   Result Value Ref Range    Sodium 139 136 - 145 mmol/L    Potassium 4.1 3.5 - 5.0 mmol/L    Chloride 106 98 - 107 mmol/L    CO2 23 22 - 31 mmol/L    Anion Gap, Calculation 10 5 - 18 mmol/L    Glucose 90 70 - 125 mg/dL    BUN 17 8 - 22 mg/dL    Creatinine 1.10 0.70 - 1.30 mg/dL    GFR MDRD Af Amer >60 >60 mL/min/1.73m2    GFR MDRD Non Af Amer >60 >60 mL/min/1.73m2    Bilirubin, Total 1.0 0.0 - 1.0 mg/dL    Calcium 9.5 8.5 - 10.5 mg/dL    Protein, Total 7.0 6.0 - 8.0 g/dL    Albumin 4.5 3.5 - 5.0 g/dL    Alkaline Phosphatase 46 45 - 120 U/L    AST 17 0 - 40 U/L    ALT 13 0 - 45 U/L    Narrative    Fasting Glucose reference range is 70-99 mg/dL per  American Diabetes Association (ADA) guidelines.   Urinalysis-UC if Indicated   Result Value Ref Range    Color, UA Yellow Colorless, Yellow, Straw, Light Yellow    Clarity, UA Clear Clear    Glucose, UA Negative Negative    Bilirubin, UA Negative Negative    Ketones, UA Negative Negative    Specific Gravity, UA >=1.030 1.005 - 1.030    Blood, UA Negative Negative    pH, UA 6.0 5.0 - 8.0    Protein, UA Negative Negative mg/dL    Urobilinogen, UA 0.2 E.U./dL 0.2 E.U./dL, 1.0 E.U./dL    Nitrite, UA Negative Negative    Leukocytes, UA Negative Negative    Bacteria, UA None Seen None Seen hpf    RBC, UA None Seen None Seen, 0-2 hpf    WBC, UA None Seen None Seen, 0-5 hpf    Squam Epithel, UA None Seen None Seen,  0-5 lpf    Narrative    UC not indicated   HM2(CBC w/o Differential)   Result Value Ref Range    WBC 4.6 4.0 - 11.0 thou/uL    RBC 4.77 4.40 - 6.20 mill/uL    Hemoglobin 14.5 14.0 - 18.0 g/dL    Hematocrit 42.7 40.0 - 54.0 %    MCV 90 80 - 100 fL    MCH 30.3 27.0 - 34.0 pg    MCHC 33.9 32.0 - 36.0 g/dL    RDW 11.6 11.0 - 14.5 %    Platelets 211 140 - 440 thou/uL    MPV 7.8 7.0 - 10.0 fL   Vitamin D, Total (25-Hydroxy)   Result Value Ref Range    Vitamin D, Total (25-Hydroxy) 32.3 30.0 - 80.0 ng/mL    Narrative    Deficiency <10.0 ng/mL  Insufficiency 10.0-29.9 ng/mL  Sufficiency 30.0-80.0 ng/mL  Toxicity (possible) >100.0 ng/mL   COVID-19 Virus (Coronavirus) Antibody Screen, IgG   Result Value Ref Range    COVID -19 Jennifer IgG  Negative       Comment:      Negative results do not rule out SARS-CoV-2 infection, particularly in those who have been in contact with the virus. Follow-up testing with a molecular diagnostic should be considered to rule out infection in these individuals. Results from antibody testing should not be used as the sole basis to diagnose or exclude SARS-CoV-2 infection or to inform infection status.  This automated Chemiluminescent microparticle immunoassay (CMIA) by [employer] on the  i2000 instrument has been given Emergency Use Authorization (EUA).The performance characteristics of this test were determined by [facility]. The laboratory is regulated under CLIA as qualified to perform high-complexity testing.This test is used for clinical purposes.It should not be regarded as investigational or for research.       Your labs look perfect Father.  You probably did not have COVID.     Please call with questions or contact us using [employer].    Sincerely,        Electronically signed by Neptali Jacobsen MD

## 2021-10-03 ENCOUNTER — HEALTH MAINTENANCE LETTER (OUTPATIENT)
Age: 36
End: 2021-10-03

## 2022-09-04 ENCOUNTER — HEALTH MAINTENANCE LETTER (OUTPATIENT)
Age: 37
End: 2022-09-04

## 2022-10-11 ENCOUNTER — OFFICE VISIT (OUTPATIENT)
Dept: INTERNAL MEDICINE | Facility: CLINIC | Age: 37
End: 2022-10-11
Payer: COMMERCIAL

## 2022-10-11 VITALS
SYSTOLIC BLOOD PRESSURE: 112 MMHG | OXYGEN SATURATION: 98 % | WEIGHT: 162 LBS | DIASTOLIC BLOOD PRESSURE: 58 MMHG | TEMPERATURE: 98 F | HEIGHT: 75 IN | BODY MASS INDEX: 20.14 KG/M2 | HEART RATE: 70 BPM

## 2022-10-11 DIAGNOSIS — R10.13 DYSPEPSIA: ICD-10-CM

## 2022-10-11 DIAGNOSIS — R11.0 NAUSEA: ICD-10-CM

## 2022-10-11 DIAGNOSIS — N17.9 ACUTE KIDNEY INJURY (H): ICD-10-CM

## 2022-10-11 DIAGNOSIS — N30.01 ACUTE CYSTITIS WITH HEMATURIA: Primary | ICD-10-CM

## 2022-10-11 LAB
ALBUMIN SERPL BCG-MCNC: 4.5 G/DL (ref 3.5–5.2)
ALBUMIN UR-MCNC: NEGATIVE MG/DL
ALP SERPL-CCNC: 45 U/L (ref 40–129)
ALT SERPL W P-5'-P-CCNC: 11 U/L (ref 10–50)
ANION GAP SERPL CALCULATED.3IONS-SCNC: 8 MMOL/L (ref 7–15)
APPEARANCE UR: CLEAR
AST SERPL W P-5'-P-CCNC: 21 U/L (ref 10–50)
BILIRUB SERPL-MCNC: 0.4 MG/DL
BILIRUB UR QL STRIP: NEGATIVE
BUN SERPL-MCNC: 18.8 MG/DL (ref 6–20)
CALCIUM SERPL-MCNC: 9.6 MG/DL (ref 8.6–10)
CHLORIDE SERPL-SCNC: 102 MMOL/L (ref 98–107)
COLOR UR AUTO: YELLOW
CREAT SERPL-MCNC: 1.57 MG/DL (ref 0.67–1.17)
DEPRECATED HCO3 PLAS-SCNC: 27 MMOL/L (ref 22–29)
ERYTHROCYTE [DISTWIDTH] IN BLOOD BY AUTOMATED COUNT: 12.4 % (ref 10–15)
GFR SERPL CREATININE-BSD FRML MDRD: 58 ML/MIN/1.73M2
GLUCOSE SERPL-MCNC: 95 MG/DL (ref 70–99)
GLUCOSE UR STRIP-MCNC: NEGATIVE MG/DL
HCT VFR BLD AUTO: 41.1 % (ref 40–53)
HGB BLD-MCNC: 13.4 G/DL (ref 13.3–17.7)
HGB UR QL STRIP: NEGATIVE
KETONES UR STRIP-MCNC: NEGATIVE MG/DL
LEUKOCYTE ESTERASE UR QL STRIP: NEGATIVE
MCH RBC QN AUTO: 28.9 PG (ref 26.5–33)
MCHC RBC AUTO-ENTMCNC: 32.6 G/DL (ref 31.5–36.5)
MCV RBC AUTO: 89 FL (ref 78–100)
NITRATE UR QL: NEGATIVE
PH UR STRIP: 7 [PH] (ref 5–8)
PLATELET # BLD AUTO: 293 10E3/UL (ref 150–450)
POTASSIUM SERPL-SCNC: 4.8 MMOL/L (ref 3.4–5.3)
PROT SERPL-MCNC: 7.1 G/DL (ref 6.4–8.3)
RBC # BLD AUTO: 4.63 10E6/UL (ref 4.4–5.9)
SODIUM SERPL-SCNC: 137 MMOL/L (ref 136–145)
SP GR UR STRIP: 1.02 (ref 1–1.03)
UROBILINOGEN UR STRIP-ACNC: 0.2 E.U./DL
WBC # BLD AUTO: 6.5 10E3/UL (ref 4–11)

## 2022-10-11 PROCEDURE — 99214 OFFICE O/P EST MOD 30 MIN: CPT | Performed by: INTERNAL MEDICINE

## 2022-10-11 PROCEDURE — 36415 COLL VENOUS BLD VENIPUNCTURE: CPT | Performed by: INTERNAL MEDICINE

## 2022-10-11 PROCEDURE — 85027 COMPLETE CBC AUTOMATED: CPT | Performed by: INTERNAL MEDICINE

## 2022-10-11 PROCEDURE — 81003 URINALYSIS AUTO W/O SCOPE: CPT | Performed by: INTERNAL MEDICINE

## 2022-10-11 PROCEDURE — 80053 COMPREHEN METABOLIC PANEL: CPT | Performed by: INTERNAL MEDICINE

## 2022-10-11 NOTE — PROGRESS NOTES
1. Acute cystitis with hematuria  His symptoms of hematuria were likely cystitis related however he never did grow anything out.  I think we should further evaluate his urinary tract anatomically with CT scan.  If he has further episodes he will need to meet with urology.  - CT Abdomen Pelvis w/o & w Contrast; Future    2. Nausea  We will give him a trial of omeprazole once a day in the morning.  We will see if this improves his symptoms at all.    3. Dyspepsia  As above.  Trial of omeprazole.  Check lab work.  Touch base in a month.  - omeprazole (PRILOSEC) 20 MG DR capsule; Take 1 capsule (20 mg) by mouth daily  Dispense: 30 capsule; Refill: 0  - Comprehensive metabolic panel (BMP + Alb, Alk Phos, ALT, AST, Total. Bili, TP); Future  - CBC with platelets; Future    Subjective   Abram is a 37 year old, presenting for the following health issues:  UTI (9/29 follow up - pt notes ua frequency this morning (no hematuria, no dysuria, no fevers, etc) )      UTI    History of Present Illness       Reason for visit:  Followup from having a UTI two weeks ago  Symptom onset:  3-4 weeks ago    He eats 2-3 servings of fruits and vegetables daily.He consumes 0 sweetened beverage(s) daily.He exercises with enough effort to increase his heart rate 9 or less minutes per day.  He exercises with enough effort to increase his heart rate 3 or less days per week.   He is taking medications regularly.         Father comes in today for ER follow-up.  He was seen in the emergency room in [place] in [place] for evaluation of hematuria.  He had not been feeling well for couple days.  He was diagnosed with urinary tract infection and sent home on Keflex.  He felt better after couple days.  He still continues to feel okay although he does note some urinary frequency.  He denies any sexual encounters.  He was tested negative for GC and chlamydia.  He has never had urinary tract infections before.  No known risk factors for urinary  "tract infections.  He does note ongoing abdominal discomfort in the morning.  If he drinks milk he feels worse.  Later in the day he does fine with milk.    Review of Systems         Objective    Ht 1.899 m (6' 2.75\")   Wt 73.5 kg (162 lb)   BMI 20.38 kg/m    Body mass index is 20.38 kg/m .  Physical Exam   Pleasant thin gentleman in no distress.                    "

## 2022-10-19 ENCOUNTER — HOSPITAL ENCOUNTER (OUTPATIENT)
Dept: ULTRASOUND IMAGING | Facility: CLINIC | Age: 37
Discharge: HOME | End: 2022-10-19
Attending: INTERNAL MEDICINE | Admitting: INTERNAL MEDICINE
Payer: COMMERCIAL

## 2022-10-19 DIAGNOSIS — N17.9 ACUTE KIDNEY INJURY (H): ICD-10-CM

## 2022-10-19 PROCEDURE — 76770 US EXAM ABDO BACK WALL COMP: CPT

## 2022-10-28 ENCOUNTER — HOSPITAL ENCOUNTER (OUTPATIENT)
Dept: CT IMAGING | Facility: CLINIC | Age: 37
Discharge: HOME | End: 2022-10-28
Attending: INTERNAL MEDICINE | Admitting: INTERNAL MEDICINE
Payer: COMMERCIAL

## 2022-10-28 DIAGNOSIS — N30.01 ACUTE CYSTITIS WITH HEMATURIA: ICD-10-CM

## 2022-10-28 PROCEDURE — 250N000011 HC RX IP 250 OP 636: Performed by: INTERNAL MEDICINE

## 2022-10-28 PROCEDURE — 250N000009 HC RX 250: Performed by: INTERNAL MEDICINE

## 2022-10-28 PROCEDURE — 74178 CT ABD&PLV WO CNTR FLWD CNTR: CPT

## 2022-10-28 RX ORDER — IOPAMIDOL 755 MG/ML
500 INJECTION, SOLUTION INTRAVASCULAR ONCE
Status: COMPLETED | OUTPATIENT
Start: 2022-10-28 | End: 2022-10-28

## 2022-10-28 RX ADMIN — SODIUM CHLORIDE 90 ML: 9 INJECTION, SOLUTION INTRAVENOUS at 12:55

## 2022-10-28 RX ADMIN — IOPAMIDOL 117 ML: 755 INJECTION, SOLUTION INTRAVENOUS at 12:55

## 2022-11-03 ENCOUNTER — VIRTUAL VISIT (OUTPATIENT)
Dept: INTERNAL MEDICINE | Facility: CLINIC | Age: 37
End: 2022-11-03
Payer: COMMERCIAL

## 2022-11-03 DIAGNOSIS — K30 UPSET STOMACH: ICD-10-CM

## 2022-11-03 DIAGNOSIS — N17.9 ACUTE KIDNEY INJURY (H): Primary | ICD-10-CM

## 2022-11-03 DIAGNOSIS — Z12.11 SPECIAL SCREENING FOR MALIGNANT NEOPLASMS, COLON: ICD-10-CM

## 2022-11-03 DIAGNOSIS — R10.13 DYSPEPSIA: ICD-10-CM

## 2022-11-03 PROCEDURE — 99213 OFFICE O/P EST LOW 20 MIN: CPT | Mod: 95 | Performed by: INTERNAL MEDICINE

## 2022-11-03 NOTE — Clinical Note
Please contact patient to schedule labs closer to home as soon as possible, within the next couple of weeks.  Also have him scheduled for a physical with me within the next 6 months.  Thank you so much

## 2022-11-03 NOTE — PROGRESS NOTES
Abram is a 37 year old who is being evaluated via a billable telephone visit.      What phone number would you like to be contacted at? [phone]  How would you like to obtain your AVS? MyChart    1. Acute kidney injury (H)  Recheck renal function to make sure that it is back to normal  - Basic metabolic panel  (Ca, Cl, CO2, Creat, Gluc, K, Na, BUN); Future    2. Upset stomach  We will have him meet with GI.  He may need EGD.  He does not believe the omeprazole is doing anything so he can discontinue it at this time  - Adult GI  Referral - Consult Only; Future    3. Dyspepsia  As above.  - Adult GI  Referral - Consult Only; Future    4. Special screening for malignant neoplasms, colon  Strong family history of colon cancer.  Due for colonoscopy.  He wants it done close to home in [place]  - Colonoscopy Screening  Referral; Future    Subjective   Abram is a 37 year old accompanied by his ALONE, presenting for the following health issues:  No chief complaint on file.      HPI       Father joins me for follow-up.  He had a UTI.  He also had an acute injury of his kidney with increased creatinine.  He underwent ultrasound which was negative.  He needs follow-up renal function.  He is feeling better now.  Not 100%.  Still with dyspepsia or uncomfortable feeling in his stomach when he eats.  He wonders if he has some allergy.  He is also due for colonoscopy.  He is actually overdue.    Review of Systems         Objective           Vitals:  No vitals were obtained today due to virtual visit.    Physical Exam     Phone call duration: 12 minutes

## 2022-11-04 NOTE — PROGRESS NOTES
Tried calling pt directly. No answer or vm. Called and spoke to pt's father Ezekiel per pt's consent to communicate document. Ezekiel agreed to pass along central scheduling line number and a request for labs within 2 wks and a physical w/ Dr. Jacobsen within 6 months. 1st attempt to contact.

## 2022-11-11 ENCOUNTER — LAB (OUTPATIENT)
Dept: LAB | Facility: CLINIC | Age: 37
End: 2022-11-11
Payer: COMMERCIAL

## 2022-11-11 DIAGNOSIS — N17.9 ACUTE KIDNEY INJURY (H): ICD-10-CM

## 2022-11-11 LAB
ANION GAP SERPL CALCULATED.3IONS-SCNC: 4 MMOL/L (ref 3–14)
BUN SERPL-MCNC: 21 MG/DL (ref 7–30)
CALCIUM SERPL-MCNC: 9.4 MG/DL (ref 8.5–10.1)
CHLORIDE BLD-SCNC: 109 MMOL/L (ref 94–109)
CO2 SERPL-SCNC: 30 MMOL/L (ref 20–32)
CREAT SERPL-MCNC: 1.05 MG/DL (ref 0.66–1.25)
GFR SERPL CREATININE-BSD FRML MDRD: >90 ML/MIN/1.73M2
GLUCOSE BLD-MCNC: 89 MG/DL (ref 70–99)
POTASSIUM BLD-SCNC: 4.3 MMOL/L (ref 3.4–5.3)
SODIUM SERPL-SCNC: 143 MMOL/L (ref 133–144)

## 2022-11-11 PROCEDURE — 80048 BASIC METABOLIC PNL TOTAL CA: CPT

## 2022-11-11 PROCEDURE — 36415 COLL VENOUS BLD VENIPUNCTURE: CPT

## 2022-11-21 ENCOUNTER — TRANSFERRED RECORDS (OUTPATIENT)
Dept: HEALTH INFORMATION MANAGEMENT | Facility: CLINIC | Age: 37
End: 2022-11-21

## 2022-11-21 PROCEDURE — 88305 TISSUE EXAM BY PATHOLOGIST: CPT | Mod: 26

## 2022-11-21 PROCEDURE — 88305 TISSUE EXAM BY PATHOLOGIST: CPT | Mod: TC,ORL | Performed by: INTERNAL MEDICINE

## 2022-11-22 ENCOUNTER — LAB REQUISITION (OUTPATIENT)
Dept: LAB | Facility: CLINIC | Age: 37
End: 2022-11-22
Payer: COMMERCIAL

## 2022-11-22 DIAGNOSIS — D12.8 BENIGN NEOPLASM OF RECTUM: ICD-10-CM

## 2022-11-22 DIAGNOSIS — K57.30 DIVERTICULOSIS OF LARGE INTESTINE WITHOUT PERFORATION OR ABSCESS WITHOUT BLEEDING: ICD-10-CM

## 2022-11-22 DIAGNOSIS — Z83.719 FAMILY HISTORY OF COLONIC POLYPS: ICD-10-CM

## 2022-11-27 LAB
PATH REPORT.COMMENTS IMP SPEC: NORMAL
PATH REPORT.COMMENTS IMP SPEC: NORMAL
PATH REPORT.FINAL DX SPEC: NORMAL
PATH REPORT.GROSS SPEC: NORMAL
PATH REPORT.MICROSCOPIC SPEC OTHER STN: NORMAL
PATH REPORT.RELEVANT HX SPEC: NORMAL
PHOTO IMAGE: NORMAL

## 2023-01-15 ENCOUNTER — HEALTH MAINTENANCE LETTER (OUTPATIENT)
Age: 38
End: 2023-01-15

## 2024-01-10 ENCOUNTER — TELEPHONE (OUTPATIENT)
Dept: INTERNAL MEDICINE | Facility: CLINIC | Age: 39
End: 2024-01-10
Payer: COMMERCIAL

## 2024-01-10 DIAGNOSIS — T75.3XXA MOTION SICKNESS, INITIAL ENCOUNTER: Primary | ICD-10-CM

## 2024-01-10 NOTE — TELEPHONE ENCOUNTER
New Medication Request    Contacts         Type Contact Phone/Fax    01/10/2024 02:43 PM CST Phone (Incoming) Abram Lemus TONNY (Self) [phone] (M)            What medication are you requesting?: a friend of his this medication and it worked good for him:  Pt is going on a fishing trip and hoping to get this medication.   Scopolamine patch for behind the ear    Reason for medication request: see above    Have you taken this medication before?: No    Controlled Substance Agreement on file:   CSA -- Patient Level:    CSA: None found at the patient level.         Patient offered an appointment? No    Preferred Pharmacy:  [facility] - [place], [place] - [address]  [address] [facility] [address]  Phone: [phone] Fax: [phone]      Could we send this information to you in [employer] or would you prefer to receive a phone call?:   Patient would prefer a phone call   Okay to leave a detailed message?: Yes at Cell number on file:    Telephone Information:   Mobile [phone]

## 2024-01-11 RX ORDER — SCOLOPAMINE TRANSDERMAL SYSTEM 1 MG/1
1 PATCH, EXTENDED RELEASE TRANSDERMAL
Qty: 5 PATCH | Refills: 0 | Status: SHIPPED | OUTPATIENT
Start: 2024-01-11 | End: 2024-02-22

## 2024-01-11 NOTE — TELEPHONE ENCOUNTER
Need to know how many days he is going to be out on the boat please.  Please let me know.  Thank you.

## 2024-01-11 NOTE — TELEPHONE ENCOUNTER
Spoke with patient who states he will only be on the boat for 1 day, but would also like to request some extra patches as he does plan to do a few trips later this year. States he would like 5 if possible.

## 2024-02-18 ENCOUNTER — HEALTH MAINTENANCE LETTER (OUTPATIENT)
Age: 39
End: 2024-02-18

## 2024-02-21 SDOH — HEALTH STABILITY: PHYSICAL HEALTH: ON AVERAGE, HOW MANY DAYS PER WEEK DO YOU ENGAGE IN MODERATE TO STRENUOUS EXERCISE (LIKE A BRISK WALK)?: 1 DAY

## 2024-02-21 SDOH — HEALTH STABILITY: PHYSICAL HEALTH: ON AVERAGE, HOW MANY MINUTES DO YOU ENGAGE IN EXERCISE AT THIS LEVEL?: 100 MIN

## 2024-02-21 ASSESSMENT — SOCIAL DETERMINANTS OF HEALTH (SDOH): HOW OFTEN DO YOU GET TOGETHER WITH FRIENDS OR RELATIVES?: TWICE A WEEK

## 2024-02-22 ENCOUNTER — OFFICE VISIT (OUTPATIENT)
Dept: INTERNAL MEDICINE | Facility: CLINIC | Age: 39
End: 2024-02-22
Payer: COMMERCIAL

## 2024-02-22 VITALS
WEIGHT: 159 LBS | DIASTOLIC BLOOD PRESSURE: 60 MMHG | RESPIRATION RATE: 16 BRPM | BODY MASS INDEX: 20.41 KG/M2 | HEART RATE: 68 BPM | SYSTOLIC BLOOD PRESSURE: 104 MMHG | TEMPERATURE: 98.3 F | OXYGEN SATURATION: 96 % | HEIGHT: 74 IN

## 2024-02-22 DIAGNOSIS — J30.0 VASOMOTOR RHINITIS: ICD-10-CM

## 2024-02-22 DIAGNOSIS — E73.9 LACTOSE INTOLERANCE: ICD-10-CM

## 2024-02-22 DIAGNOSIS — Z00.00 ROUTINE ADULT HEALTH MAINTENANCE: Primary | ICD-10-CM

## 2024-02-22 DIAGNOSIS — R79.89 ELEVATED SERUM CREATININE: ICD-10-CM

## 2024-02-22 LAB
ALBUMIN SERPL BCG-MCNC: 4.7 G/DL (ref 3.5–5.2)
ALBUMIN UR-MCNC: NEGATIVE MG/DL
ALP SERPL-CCNC: 44 U/L (ref 40–150)
ALT SERPL W P-5'-P-CCNC: 13 U/L (ref 0–70)
ANION GAP SERPL CALCULATED.3IONS-SCNC: 9 MMOL/L (ref 7–15)
APPEARANCE UR: CLEAR
AST SERPL W P-5'-P-CCNC: 22 U/L (ref 0–45)
BILIRUB SERPL-MCNC: 0.5 MG/DL
BILIRUB UR QL STRIP: NEGATIVE
BUN SERPL-MCNC: 29.6 MG/DL (ref 6–20)
CALCIUM SERPL-MCNC: 9.7 MG/DL (ref 8.6–10)
CHLORIDE SERPL-SCNC: 105 MMOL/L (ref 98–107)
CHOLEST SERPL-MCNC: 170 MG/DL
COLOR UR AUTO: YELLOW
CREAT SERPL-MCNC: 1.2 MG/DL (ref 0.67–1.17)
DEPRECATED HCO3 PLAS-SCNC: 29 MMOL/L (ref 22–29)
EGFRCR SERPLBLD CKD-EPI 2021: 80 ML/MIN/1.73M2
ERYTHROCYTE [DISTWIDTH] IN BLOOD BY AUTOMATED COUNT: 11.8 % (ref 10–15)
FASTING STATUS PATIENT QL REPORTED: YES
GLUCOSE SERPL-MCNC: 86 MG/DL (ref 70–99)
GLUCOSE UR STRIP-MCNC: NEGATIVE MG/DL
HCT VFR BLD AUTO: 44.9 % (ref 40–53)
HDLC SERPL-MCNC: 43 MG/DL
HGB BLD-MCNC: 15 G/DL (ref 13.3–17.7)
HGB UR QL STRIP: NEGATIVE
KETONES UR STRIP-MCNC: NEGATIVE MG/DL
LDLC SERPL CALC-MCNC: 116 MG/DL
LEUKOCYTE ESTERASE UR QL STRIP: NEGATIVE
MCH RBC QN AUTO: 29.5 PG (ref 26.5–33)
MCHC RBC AUTO-ENTMCNC: 33.4 G/DL (ref 31.5–36.5)
MCV RBC AUTO: 88 FL (ref 78–100)
NITRATE UR QL: NEGATIVE
NONHDLC SERPL-MCNC: 127 MG/DL
PH UR STRIP: 6 [PH] (ref 5–8)
PLATELET # BLD AUTO: 229 10E3/UL (ref 150–450)
POTASSIUM SERPL-SCNC: 4.8 MMOL/L (ref 3.4–5.3)
PROT SERPL-MCNC: 7.3 G/DL (ref 6.4–8.3)
RBC # BLD AUTO: 5.09 10E6/UL (ref 4.4–5.9)
SODIUM SERPL-SCNC: 143 MMOL/L (ref 135–145)
SP GR UR STRIP: 1.02 (ref 1–1.03)
TRIGL SERPL-MCNC: 54 MG/DL
UROBILINOGEN UR STRIP-ACNC: 0.2 E.U./DL
WBC # BLD AUTO: 5.6 10E3/UL (ref 4–11)

## 2024-02-22 PROCEDURE — 81003 URINALYSIS AUTO W/O SCOPE: CPT | Performed by: INTERNAL MEDICINE

## 2024-02-22 PROCEDURE — 99395 PREV VISIT EST AGE 18-39: CPT | Performed by: INTERNAL MEDICINE

## 2024-02-22 PROCEDURE — 36415 COLL VENOUS BLD VENIPUNCTURE: CPT | Performed by: INTERNAL MEDICINE

## 2024-02-22 PROCEDURE — 80053 COMPREHEN METABOLIC PANEL: CPT | Performed by: INTERNAL MEDICINE

## 2024-02-22 PROCEDURE — 85027 COMPLETE CBC AUTOMATED: CPT | Performed by: INTERNAL MEDICINE

## 2024-02-22 PROCEDURE — 80061 LIPID PANEL: CPT | Performed by: INTERNAL MEDICINE

## 2024-02-22 NOTE — PATIENT INSTRUCTIONS
Preventive Care Advice   This is general advice given by our system to help you stay healthy. However, your care team may have specific advice just for you. Please talk to your care team about your preventive care needs.  Nutrition  Eat 5 or more servings of fruits and vegetables each day.  Try wheat bread, brown rice and whole grain pasta (instead of white bread, rice, and pasta).  Get enough calcium and vitamin D. Check the label on foods and aim for 100% of the RDA (recommended daily allowance).  Lifestyle  Exercise at least 150 minutes each week  (30 minutes a day, 5 days a week).  Do muscle strengthening activities 2 days a week. These help control your weight and prevent disease.  No smoking.  Wear sunscreen to prevent skin cancer.  Have a dental exam and cleaning every 6 months.  Yearly exams  See your health care team every year to talk about:  Any changes in your health.  Any medicines your care team has prescribed.  Preventive care, family planning, and ways to prevent chronic diseases.  Shots (vaccines)   HPV shots (up to age 26), if you've never had them before.  Hepatitis B shots (up to age 59), if you've never had them before.  COVID-19 shot: Get this shot when it's due.  Flu shot: Get a flu shot every year.  Tetanus shot: Get a tetanus shot every 10 years.  Pneumococcal, hepatitis A, and RSV shots: Ask your care team if you need these based on your risk.  Shingles shot (for age 50 and up)  General health tests  Diabetes screening:  Starting at age 35, Get screened for diabetes at least every 3 years.  If you are younger than age 35, ask your care team if you should be screened for diabetes.  Cholesterol test: At age 39, start having a cholesterol test every 5 years, or more often if advised.  Bone density scan (DEXA): At age 50, ask your care team if you should have this scan for osteoporosis (brittle bones).  Hepatitis C: Get tested at least once in your life.  STIs (sexually transmitted  infections)  Before age 24: Ask your care team if you should be screened for STIs.  After age 24: Get screened for STIs if you're at risk. You are at risk for STIs (including HIV) if:  You are sexually active with more than one person.  You don't use condoms every time.  You or a partner was diagnosed with a sexually transmitted infection.  If you are at risk for HIV, ask about PrEP medicine to prevent HIV.  Get tested for HIV at least once in your life, whether you are at risk for HIV or not.  Cancer screening tests  Cervical cancer screening: If you have a cervix, begin getting regular cervical cancer screening tests starting at age 21.  Breast cancer scan (mammogram): If you've ever had breasts, begin having regular mammograms starting at age 40. This is a scan to check for breast cancer.  Colon cancer screening: It is important to start screening for colon cancer at age 45.  Have a colonoscopy test every 10 years (or more often if you're at risk) Or, ask your provider about stool tests like a FIT test every year or Cologuard test every 3 years.  To learn more about your testing options, visit:   https://www.Basys/743514.pdf.  For help making a decision, visit:   https://bit.ly/ig00176.  Prostate cancer screening test: If you have a prostate, ask your care team if a prostate cancer screening test (PSA) at age 55 is right for you.  Lung cancer screening: If you are a current or former smoker ages 50 to 80, ask your care team if ongoing lung cancer screenings are right for you.  For informational purposes only. Not to replace the advice of your health care provider. Copyright   2023 [facility]. All rights reserved. Clinically reviewed by the [facility]. [employer]se[employer] [id number] - REV 01/24.    Learning About Stress  What is stress?     Stress is your body's response to a hard situation. Your body can have a physical, emotional, or mental response. Stress is a fact of life for  most people, and it affects everyone differently. What causes stress for you may not be stressful for someone else.  A lot of things can cause stress. You may feel stress when you go on a job interview, take a test, or run a race. This kind of short-term stress is normal and even useful. It can help you if you need to work hard or react quickly. For example, stress can help you finish an important job on time.  Long-term stress is caused by ongoing stressful situations or events. Examples of long-term stress include long-term health problems, ongoing problems at work, or conflicts in your family. Long-term stress can harm your health.  How does stress affect your health?  When you are stressed, your body responds as though you are in danger. It makes hormones that speed up your heart, make you breathe faster, and give you a burst of energy. This is called the fight-or-flight stress response. If the stress is over quickly, your body goes back to normal and no harm is done.  But if stress happens too often or lasts too long, it can have bad effects. Long-term stress can make you more likely to get sick, and it can make symptoms of some diseases worse. If you tense up when you are stressed, you may develop neck, shoulder, or low back pain. Stress is linked to high blood pressure and heart disease.  Stress also harms your emotional health. It can make you santana, tense, or depressed. Your relationships may suffer, and you may not do well at work or school.  What can you do to manage stress?  You can try these things to help manage stress:   Do something active. Exercise or activity can help reduce stress. Walking is a great way to get started. Even everyday activities such as housecleaning or yard work can help.  Try yoga or phylicia chi. These techniques combine exercise and meditation. You may need some training at first to learn them.  Do something you enjoy. For example, listen to music or go to a movie. Practice your  "hobby or do volunteer work.  Meditate. This can help you relax, because you are not worrying about what happened before or what may happen in the future.  Do guided imagery. Imagine yourself in any setting that helps you feel calm. You can use online videos, books, or a teacher to guide you.  Do breathing exercises. For example:  From a standing position, bend forward from the waist with your knees slightly bent. Let your arms dangle close to the floor.  Breathe in slowly and deeply as you return to a standing position. Roll up slowly and lift your head last.  Hold your breath for just a few seconds in the standing position.  Breathe out slowly and bend forward from the waist.  Let your feelings out. Talk, laugh, cry, and express anger when you need to. Talking with supportive friends or family, a counselor, or a dann leader about your feelings is a healthy way to relieve stress. Avoid discussing your feelings with people who make you feel worse.  Write. It may help to write about things that are bothering you. This helps you find out how much stress you feel and what is causing it. When you know this, you can find better ways to cope.  What can you do to prevent stress?  You might try some of these things to help prevent stress:  Manage your time. This helps you find time to do the things you want and need to do.  Get enough sleep. Your body recovers from the stresses of the day while you are sleeping.  Get support. Your family, friends, and community can make a difference in how you experience stress.  Limit your news feed. Avoid or limit time on social media or news that may make you feel stressed.  Do something active. Exercise or activity can help reduce stress. Walking is a great way to get started.  Where can you learn more?  Go to https://www.healthwise.net/patiented  Enter N032 in the search box to learn more about \"Learning About Stress.\"  Current as of: February 26, 2023               Content Version: " 13.8    0804-5436 [employer].   Care instructions adapted under license by your healthcare professional. If you have questions about a medical condition or this instruction, always ask your healthcare professional. [employer] disclaims any warranty or liability for your use of this information.

## 2024-02-22 NOTE — PROGRESS NOTES
Preventive Care Visit  [facility]  SHERITA MAYES MD, Internal Medicine  Feb 22, 2024    1. Routine adult health maintenance  Labs as below.  He declines all immunizations.  I did urged him to at least get a tetanus booster as he is due.  Will track down his colonoscopy results.  As far as this hormonal practitioner goes, I did urge him to probably not spend large amounts of money for unproven treatments etc.  We discussed eating healthy and staying active.  - Comprehensive metabolic panel (BMP + Alb, Alk Phos, ALT, AST, Total. Bili, TP); Future  - Lipid panel reflex to direct LDL Fasting; Future  - CBC with platelets; Future  - UA Macroscopic with reflex to Microscopic and Culture - Lab Collect; Future  - Comprehensive metabolic panel (BMP + Alb, Alk Phos, ALT, AST, Total. Bili, TP)  - Lipid panel reflex to direct LDL Fasting  - CBC with platelets  - UA Macroscopic with reflex to Microscopic and Culture - Lab Collect    2. Lactose intolerance  I told patient we have no other treatment for this other than avoidance of lactose or supplementation with lactase tablets etc.    3. Vasomotor rhinitis  He has improved his symptoms with just staying warmer.    Martin Valverde is a 38 year old, presenting for the following:  Physical (Fasting today for labs )         Health Care Directive  Patient does not have a Health Care Directive or Living Will: Discussed advance care planning with patient; however, patient declined at this time.    HPI    Father comes in today for physical.  Reports things have been going well.  Still remains at [facility].  He is 7 years and.  He had a colonoscopy recently.  He is on a 5-year callback given a family history of colon cancer in his father.  Parents are doing well in [employer].  He continues to play basketball.  He was having issues with perennial rhinitis and notes that if he keeps his legs warm he does not have symptoms anymore.  He heard about a program  called [facility] that measures hormones and can prevent issues with health early.  I told him I do not know anything about that.          2/21/2024   General Health   How would you rate your overall physical health? Excellent   Feel stress (tense, anxious, or unable to sleep) Only a little   (!) STRESS CONCERN      2/21/2024   Nutrition   Three or more servings of calcium each day? (!) NO   Diet: Other   If other, please elaborate: I somewhat avoid foods with lactose, especially in the morning.   How many servings of fruit and vegetables per day? (!) 0-1   How many sweetened beverages each day? 0-1         2/21/2024   Exercise   Days per week of moderate/strenous exercise 1 day   Average minutes spent exercising at this level 100 min   (!) EXERCISE CONCERN      2/21/2024   Social Factors   Frequency of gathering with friends or relatives Twice a week   Worry food won't last until get money to buy more No   Food not last or not have enough money for food? No   Do you have housing?  Yes   Are you worried about losing your housing? No   Lack of transportation? No   Unable to get utilities (heat,electricity)? No         2/21/2024   Dental   Dentist two times every year? Yes         2/21/2024   TB Screening   Were you born outside of [place]?  No         Today's PHQ-2 Score:       2/21/2024     3:31 PM   PHQ-2 ( 1999 [employer])   Q1: Little interest or pleasure in doing things 0   Q2: Feeling down, depressed or hopeless 0   PHQ-2 Score 0   Q1: Little interest or pleasure in doing things Not at all   Q2: Feeling down, depressed or hopeless Not at all   PHQ-2 Score 0           2/21/2024   Substance Use   Alcohol more than 3/day or more than 7/wk No   Do you use any other substances recreationally? (!) ALCOHOL     Social History     Tobacco Use    Smoking status: Never    Smokeless tobacco: Never             2/21/2024   One time HIV Screening   Previous HIV test? No         2/21/2024   STI Screening   New sexual partner(s) since  "last STI/HIV test? No         2/21/2024   Contraception/Family Planning   Questions about contraception or family planning No        Reviewed and updated as needed this visit by Provider                             Objective    Exam  /60 (BP Location: Left arm, Patient Position: Sitting, Cuff Size: Adult Regular)   Pulse 68   Temp 98.3  F (36.8  C) (Tympanic)   Resp 16   Ht 1.88 m (6' 2\")   Wt 72.1 kg (159 lb)   SpO2 96%   BMI 20.41 kg/m     Estimated body mass index is 20.41 kg/m  as calculated from the following:    Height as of this encounter: 1.88 m (6' 2\").    Weight as of this encounter: 72.1 kg (159 lb).    Physical Exam  GENERAL: alert and no distress. Very thin.   EYES: Eyes grossly normal to inspection, PERRL and conjunctivae and sclerae normal  HENT: ear canals and TM's normal, nose and mouth without ulcers or lesions  NECK: no adenopathy, no asymmetry, masses, or scars  RESP: lungs clear to auscultation - no rales, rhonchi or wheezes  CV: regular rate and rhythm, normal S1 S2, no S3 or S4, no murmur, click or rub, no peripheral edema  ABDOMEN: soft, nontender, no hepatosplenomegaly, no masses and bowel sounds normal  MS: no gross musculoskeletal defects noted, no edema  SKIN: no suspicious lesions or rashes  NEURO: Normal strength and tone, mentation intact and speech normal  PSYCH: mentation appears normal, affect normal/bright      Signed Electronically by: SHERITA MAYES MD    "

## 2024-04-01 ENCOUNTER — TELEPHONE (OUTPATIENT)
Dept: INTERNAL MEDICINE | Facility: CLINIC | Age: 39
End: 2024-04-01

## 2024-04-01 NOTE — TELEPHONE ENCOUNTER
04/01 Tried to call cell number voice mail iks not set up yet, will send a my chart message to pt

## 2024-04-01 NOTE — TELEPHONE ENCOUNTER
----- Message from Neptali Jacobsen MD sent at 4/1/2024  8:12 AM CDT -----  Please let pt know he is overdue for follow up lab work.  Please schedule a lab appt for this week.  Thanks.

## 2024-04-04 ENCOUNTER — TELEPHONE (OUTPATIENT)
Dept: INTERNAL MEDICINE | Facility: CLINIC | Age: 39
End: 2024-04-04

## 2024-04-04 NOTE — TELEPHONE ENCOUNTER
----- Message from Pam J. Behr sent at 4/2/2024  5:41 PM CDT -----  Called pt and no VM  ----- Message -----  From: Neptali Jacobsen MD  Sent: 4/1/2024   8:13 AM CDT  To: [facility] Scheduling Registration Pool    Please let pt know he is overdue for follow up lab work.  Please schedule a lab appt for this week.  Thanks.

## 2024-10-01 ENCOUNTER — OFFICE VISIT (OUTPATIENT)
Dept: INTERNAL MEDICINE | Facility: CLINIC | Age: 39
End: 2024-10-01
Payer: COMMERCIAL

## 2024-10-01 VITALS
WEIGHT: 154 LBS | HEART RATE: 71 BPM | BODY MASS INDEX: 19.76 KG/M2 | TEMPERATURE: 97.7 F | OXYGEN SATURATION: 97 % | DIASTOLIC BLOOD PRESSURE: 62 MMHG | HEIGHT: 74 IN | SYSTOLIC BLOOD PRESSURE: 118 MMHG | RESPIRATION RATE: 14 BRPM

## 2024-10-01 DIAGNOSIS — N62 GYNECOMASTIA, MALE: Primary | ICD-10-CM

## 2024-10-01 LAB
ESTRADIOL SERPL-MCNC: 31 PG/ML
FSH SERPL IRP2-ACNC: 17.8 MIU/ML (ref 1.5–12.4)
LH SERPL-ACNC: 8.1 MIU/ML (ref 1.7–8.6)
PROLACTIN SERPL 3RD IS-MCNC: 5 NG/ML (ref 4–15)
TSH SERPL DL<=0.005 MIU/L-ACNC: 1.88 UIU/ML (ref 0.3–4.2)

## 2024-10-01 PROCEDURE — G2211 COMPLEX E/M VISIT ADD ON: HCPCS | Performed by: INTERNAL MEDICINE

## 2024-10-01 PROCEDURE — 82670 ASSAY OF TOTAL ESTRADIOL: CPT | Performed by: INTERNAL MEDICINE

## 2024-10-01 PROCEDURE — 84403 ASSAY OF TOTAL TESTOSTERONE: CPT | Performed by: INTERNAL MEDICINE

## 2024-10-01 PROCEDURE — 84702 CHORIONIC GONADOTROPIN TEST: CPT | Performed by: INTERNAL MEDICINE

## 2024-10-01 PROCEDURE — 84146 ASSAY OF PROLACTIN: CPT | Performed by: INTERNAL MEDICINE

## 2024-10-01 PROCEDURE — 36415 COLL VENOUS BLD VENIPUNCTURE: CPT | Performed by: INTERNAL MEDICINE

## 2024-10-01 PROCEDURE — 99213 OFFICE O/P EST LOW 20 MIN: CPT | Performed by: INTERNAL MEDICINE

## 2024-10-01 PROCEDURE — 84443 ASSAY THYROID STIM HORMONE: CPT | Performed by: INTERNAL MEDICINE

## 2024-10-01 PROCEDURE — 83001 ASSAY OF GONADOTROPIN (FSH): CPT | Performed by: INTERNAL MEDICINE

## 2024-10-01 PROCEDURE — 83002 ASSAY OF GONADOTROPIN (LH): CPT | Performed by: INTERNAL MEDICINE

## 2024-10-01 NOTE — PROGRESS NOTES
1. Gynecomastia, male  I discussed with patient the differential.  My biggest suspicion is the supplement that he has been taking.  I have urged him to discontinue this at this time.  Will check labs as below.  Assuming labs are fine he will stay off the supplement and this should resolve on its own.  If there are abnormalities we will either do pituitary imaging and or refer to endocrinology.  Of course if there is any issues with concerning features on breast imaging and will refer to breast surgeon.  - US Breast Right Limited 1-3 Quadrants; Future  - MA Diagnostic Digital Right; Future  - Testosterone, total; Future  - TSH with free T4 reflex; Future  - Prolactin; Future  - Luteinizing Hormone; Future  - Follicle stimulating hormone; Future  - Estradiol; Future  - HCG tumor marker; Future     The longitudinal plan of care for the diagnosis(es)/condition(s) as documented were addressed during this visit. Due to the added complexity in care, I will continue to support Abram in the subsequent management and with ongoing continuity of care.    Subjective   Abram is a 39 year old, presenting for the following health issues:  Mass (Notes a marble size lump under right breast nipple )      10/1/2024    11:25 AM   Additional Questions   Roomed by NANCY Cruz   Accompanied by tonja Peralta    History of Present Illness       Reason for visit:  Lump on chest  Symptom onset:  More than a month   He is taking medications regularly.           Father comes in today for evaluation of breast enlargement on the right.  He notes a lump in his right nipple for the last month or so.  He is taking no new medications.  He has been taking a hormone supplement of some sort that is the ground up glands of animals.  This is to support his hormones he tells me.  He does not utilize marijuana or THC containing products.          Objective    /62 (BP Location: Left arm, Patient Position: Sitting, Cuff Size: Adult Regular)    "Pulse 71   Temp 97.7  F (36.5  C) (Tympanic)   Resp 14   Ht 1.88 m (6' 2\")   Wt 69.9 kg (154 lb)   SpO2 97%   BMI 19.77 kg/m    Body mass index is 19.77 kg/m .  Physical Exam   About 1 cm to 2 cm of mobile fairly soft breast tissue behind right areola.            Signed Electronically by: SHERITA MAYES MD    "

## 2024-10-02 LAB — HCG-TM SERPL-ACNC: <3 IU/L

## 2024-10-03 LAB — TESTOST SERPL-MCNC: 723 NG/DL (ref 240–950)

## 2024-10-07 ENCOUNTER — HOSPITAL ENCOUNTER (OUTPATIENT)
Dept: MAMMOGRAPHY | Facility: CLINIC | Age: 39
Discharge: HOME | End: 2024-10-07
Attending: INTERNAL MEDICINE
Payer: COMMERCIAL

## 2024-10-07 DIAGNOSIS — N62 GYNECOMASTIA, MALE: ICD-10-CM

## 2024-10-07 PROCEDURE — 77066 DX MAMMO INCL CAD BI: CPT

## 2025-03-30 ENCOUNTER — HEALTH MAINTENANCE LETTER (OUTPATIENT)
Age: 40
End: 2025-03-30

## 2025-08-21 ENCOUNTER — PATIENT OUTREACH (OUTPATIENT)
Dept: CARE COORDINATION | Facility: CLINIC | Age: 40
End: 2025-08-21
Payer: COMMERCIAL